# Patient Record
Sex: FEMALE | Race: WHITE | NOT HISPANIC OR LATINO
[De-identification: names, ages, dates, MRNs, and addresses within clinical notes are randomized per-mention and may not be internally consistent; named-entity substitution may affect disease eponyms.]

---

## 2019-01-22 ENCOUNTER — RESULT REVIEW (OUTPATIENT)
Age: 74
End: 2019-01-22

## 2019-01-22 ENCOUNTER — OUTPATIENT (OUTPATIENT)
Dept: OUTPATIENT SERVICES | Facility: HOSPITAL | Age: 74
LOS: 1 days | Discharge: HOME | End: 2019-01-22

## 2019-01-22 RX ORDER — HYDROCODONE BITARTRATE AND IBUPROFEN 7.5; 2 MG/1; MG/1
1 TABLET ORAL
Qty: 20 | Refills: 0
Start: 2019-01-22

## 2019-01-23 LAB — SURGICAL PATHOLOGY STUDY: SIGNIFICANT CHANGE UP

## 2022-12-30 PROBLEM — Z00.00 ENCOUNTER FOR PREVENTIVE HEALTH EXAMINATION: Status: ACTIVE | Noted: 2022-12-30

## 2023-01-26 ENCOUNTER — NON-APPOINTMENT (OUTPATIENT)
Age: 78
End: 2023-01-26

## 2023-01-26 ENCOUNTER — APPOINTMENT (OUTPATIENT)
Dept: HEART AND VASCULAR | Facility: CLINIC | Age: 78
End: 2023-01-26
Payer: MEDICARE

## 2023-01-26 VITALS
TEMPERATURE: 98.3 F | HEIGHT: 63 IN | SYSTOLIC BLOOD PRESSURE: 122 MMHG | HEART RATE: 77 BPM | WEIGHT: 137 LBS | BODY MASS INDEX: 24.27 KG/M2 | DIASTOLIC BLOOD PRESSURE: 68 MMHG | OXYGEN SATURATION: 96 %

## 2023-01-26 PROCEDURE — 93000 ELECTROCARDIOGRAM COMPLETE: CPT

## 2023-01-26 PROCEDURE — 99204 OFFICE O/P NEW MOD 45 MIN: CPT

## 2023-01-26 RX ORDER — SACCHAROMYCES BOULARDII 50 MG
CAPSULE ORAL TWICE DAILY
Refills: 0 | Status: ACTIVE | COMMUNITY

## 2023-01-26 RX ORDER — PANTOPRAZOLE 40 MG/1
40 TABLET, DELAYED RELEASE ORAL
Refills: 0 | Status: ACTIVE | COMMUNITY

## 2023-01-26 NOTE — HISTORY OF PRESENT ILLNESS
[FreeTextEntry1] : formerly with Dr Manjula Rushing\par GYN- Dr Sotelo\par GI- Dr Eastman\par Derm- Rebekah\par ENT- Dr Reyes\par Ophtho- Dr Musa

## 2023-01-26 NOTE — ASSESSMENT
[FreeTextEntry1] : HTN- BP excellent, no changes\par \par HLD- LDL 67, 73 on Lipitor 10 mg\par \par ASHD- CCS = 11, mild LAD dz with calcified and non-calcified plaque\par \par Health Maintanence- Shingrix x 2 2018,  Pneumovax 23 2013, Prevnar 13 2018, Flu shot annually Covid UTD\par \par CA Screening- Mammo 6/2022, colonoscopy 2021

## 2023-01-27 LAB
ANION GAP SERPL CALC-SCNC: 11 MMOL/L
APPEARANCE: CLEAR
BACTERIA: NEGATIVE
BILIRUBIN URINE: NEGATIVE
BLOOD URINE: NEGATIVE
BUN SERPL-MCNC: 12 MG/DL
CALCIUM SERPL-MCNC: 9.9 MG/DL
CHLORIDE SERPL-SCNC: 101 MMOL/L
CO2 SERPL-SCNC: 28 MMOL/L
COLOR: NORMAL
CREAT SERPL-MCNC: 0.96 MG/DL
EGFR: 62 ML/MIN/1.73M2
GLUCOSE QUALITATIVE U: NEGATIVE
GLUCOSE SERPL-MCNC: 90 MG/DL
HYALINE CASTS: 0 /LPF
KETONES URINE: NEGATIVE
LEUKOCYTE ESTERASE URINE: NEGATIVE
MICROSCOPIC-UA: NORMAL
NITRITE URINE: NEGATIVE
PH URINE: 7
POTASSIUM SERPL-SCNC: 3.9 MMOL/L
PROTEIN URINE: NEGATIVE
RED BLOOD CELLS URINE: 2 /HPF
SODIUM SERPL-SCNC: 139 MMOL/L
SPECIFIC GRAVITY URINE: 1.01
SQUAMOUS EPITHELIAL CELLS: 1 /HPF
UROBILINOGEN URINE: NORMAL
WHITE BLOOD CELLS URINE: 0 /HPF

## 2023-06-27 VITALS
TEMPERATURE: 98 F | OXYGEN SATURATION: 95 % | DIASTOLIC BLOOD PRESSURE: 71 MMHG | RESPIRATION RATE: 16 BRPM | WEIGHT: 134.48 LBS | SYSTOLIC BLOOD PRESSURE: 160 MMHG | HEART RATE: 89 BPM | HEIGHT: 63 IN

## 2023-06-27 PROCEDURE — 99291 CRITICAL CARE FIRST HOUR: CPT

## 2023-06-27 NOTE — ED ADULT NURSE NOTE - OBJECTIVE STATEMENT
Pt complaints of sudden onset of midsternal chest pain, chest pressure since 9PM tonight s/p stress.   Pt denies SOB, HA, Dizziness, N/V/D, or any other discomfort at this time.  Pt is alert and oriented, A&O4, steady gait noted. Family member is on bedside.

## 2023-06-27 NOTE — ED ADULT NURSE NOTE - NSFALLUNIVINTERV_ED_ALL_ED
Bed/Stretcher in lowest position, wheels locked, appropriate side rails in place/Call bell, personal items and telephone in reach/Instruct patient to call for assistance before getting out of bed/chair/stretcher/Non-slip footwear applied when patient is off stretcher/[place] to call system/Physically safe environment - no spills, clutter or unnecessary equipment/Purposeful proactive rounding/Room/bathroom lighting operational, light cord in reach

## 2023-06-27 NOTE — ED ADULT TRIAGE NOTE - CHIEF COMPLAINT QUOTE
I will SWITCH the dose or number of times a day I take the medications listed below when I get home from the hospital:  None
Pt, who reports being under a lot of stress" presents to ER c/o sudden onset of midsternal, non-radiating, non-reproducible chest "pressure, intense discomfort 7/10" since ~2115 tonight. 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

## 2023-06-27 NOTE — ED ADULT NURSE NOTE - CHIEF COMPLAINT QUOTE
Pt, who reports being under a lot of stress" presents to ER c/o sudden onset of midsternal, non-radiating, non-reproducible chest "pressure, intense discomfort 7/10" since ~2115 tonight. 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

## 2023-06-28 ENCOUNTER — INPATIENT (INPATIENT)
Facility: HOSPITAL | Age: 78
LOS: 0 days | Discharge: HOME | DRG: 281 | End: 2023-06-28
Attending: INTERNAL MEDICINE | Admitting: INTERNAL MEDICINE
Payer: COMMERCIAL

## 2023-06-28 ENCOUNTER — TRANSCRIPTION ENCOUNTER (OUTPATIENT)
Age: 78
End: 2023-06-28

## 2023-06-28 VITALS
SYSTOLIC BLOOD PRESSURE: 123 MMHG | OXYGEN SATURATION: 95 % | RESPIRATION RATE: 18 BRPM | HEART RATE: 75 BPM | DIASTOLIC BLOOD PRESSURE: 60 MMHG

## 2023-06-28 DIAGNOSIS — Z98.890 OTHER SPECIFIED POSTPROCEDURAL STATES: Chronic | ICD-10-CM

## 2023-06-28 DIAGNOSIS — I21.4 NON-ST ELEVATION (NSTEMI) MYOCARDIAL INFARCTION: ICD-10-CM

## 2023-06-28 DIAGNOSIS — I10 ESSENTIAL (PRIMARY) HYPERTENSION: ICD-10-CM

## 2023-06-28 DIAGNOSIS — K21.9 GASTRO-ESOPHAGEAL REFLUX DISEASE WITHOUT ESOPHAGITIS: ICD-10-CM

## 2023-06-28 DIAGNOSIS — E78.5 HYPERLIPIDEMIA, UNSPECIFIED: ICD-10-CM

## 2023-06-28 DIAGNOSIS — Z90.49 ACQUIRED ABSENCE OF OTHER SPECIFIED PARTS OF DIGESTIVE TRACT: Chronic | ICD-10-CM

## 2023-06-28 LAB
A1C WITH ESTIMATED AVERAGE GLUCOSE RESULT: 5 % — SIGNIFICANT CHANGE UP (ref 4–5.6)
ALBUMIN SERPL ELPH-MCNC: 4.4 G/DL — SIGNIFICANT CHANGE UP (ref 3.3–5)
ALP SERPL-CCNC: 88 U/L — SIGNIFICANT CHANGE UP (ref 40–120)
ALT FLD-CCNC: 20 U/L — SIGNIFICANT CHANGE UP (ref 10–45)
ANION GAP SERPL CALC-SCNC: 11 MMOL/L — SIGNIFICANT CHANGE UP (ref 5–17)
ANION GAP SERPL CALC-SCNC: 12 MMOL/L — SIGNIFICANT CHANGE UP (ref 5–17)
ANION GAP SERPL CALC-SCNC: 6 MMOL/L — SIGNIFICANT CHANGE UP (ref 5–17)
AST SERPL-CCNC: 32 U/L — SIGNIFICANT CHANGE UP (ref 10–40)
BASOPHILS # BLD AUTO: 0.04 K/UL — SIGNIFICANT CHANGE UP (ref 0–0.2)
BASOPHILS NFR BLD AUTO: 0.4 % — SIGNIFICANT CHANGE UP (ref 0–2)
BILIRUB SERPL-MCNC: 0.3 MG/DL — SIGNIFICANT CHANGE UP (ref 0.2–1.2)
BUN SERPL-MCNC: 10 MG/DL — SIGNIFICANT CHANGE UP (ref 7–23)
BUN SERPL-MCNC: 11 MG/DL — SIGNIFICANT CHANGE UP (ref 7–23)
BUN SERPL-MCNC: 15 MG/DL — SIGNIFICANT CHANGE UP (ref 7–23)
CALCIUM SERPL-MCNC: 8.2 MG/DL — LOW (ref 8.4–10.5)
CALCIUM SERPL-MCNC: 8.8 MG/DL — SIGNIFICANT CHANGE UP (ref 8.4–10.5)
CALCIUM SERPL-MCNC: 9.5 MG/DL — SIGNIFICANT CHANGE UP (ref 8.4–10.5)
CHLORIDE SERPL-SCNC: 100 MMOL/L — SIGNIFICANT CHANGE UP (ref 96–108)
CHLORIDE SERPL-SCNC: 100 MMOL/L — SIGNIFICANT CHANGE UP (ref 96–108)
CHLORIDE SERPL-SCNC: 108 MMOL/L — SIGNIFICANT CHANGE UP (ref 96–108)
CHOLEST SERPL-MCNC: 143 MG/DL — SIGNIFICANT CHANGE UP
CK MB CFR SERPL CALC: 20.6 NG/ML — HIGH (ref 0–6.7)
CK MB CFR SERPL CALC: 26 NG/ML — HIGH (ref 0–6.7)
CK SERPL-CCNC: 206 U/L — HIGH (ref 25–170)
CK SERPL-CCNC: 235 U/L — HIGH (ref 25–170)
CO2 SERPL-SCNC: 25 MMOL/L — SIGNIFICANT CHANGE UP (ref 22–31)
CO2 SERPL-SCNC: 27 MMOL/L — SIGNIFICANT CHANGE UP (ref 22–31)
CO2 SERPL-SCNC: 29 MMOL/L — SIGNIFICANT CHANGE UP (ref 22–31)
CREAT SERPL-MCNC: 0.85 MG/DL — SIGNIFICANT CHANGE UP (ref 0.5–1.3)
CREAT SERPL-MCNC: 0.86 MG/DL — SIGNIFICANT CHANGE UP (ref 0.5–1.3)
CREAT SERPL-MCNC: 0.95 MG/DL — SIGNIFICANT CHANGE UP (ref 0.5–1.3)
EGFR: 62 ML/MIN/1.73M2 — SIGNIFICANT CHANGE UP
EGFR: 70 ML/MIN/1.73M2 — SIGNIFICANT CHANGE UP
EGFR: 71 ML/MIN/1.73M2 — SIGNIFICANT CHANGE UP
EOSINOPHIL # BLD AUTO: 0.05 K/UL — SIGNIFICANT CHANGE UP (ref 0–0.5)
EOSINOPHIL NFR BLD AUTO: 0.5 % — SIGNIFICANT CHANGE UP (ref 0–6)
ESTIMATED AVERAGE GLUCOSE: 97 MG/DL — SIGNIFICANT CHANGE UP (ref 68–114)
GLUCOSE SERPL-MCNC: 112 MG/DL — HIGH (ref 70–99)
GLUCOSE SERPL-MCNC: 117 MG/DL — HIGH (ref 70–99)
GLUCOSE SERPL-MCNC: 130 MG/DL — HIGH (ref 70–99)
HCT VFR BLD CALC: 38.6 % — SIGNIFICANT CHANGE UP (ref 34.5–45)
HCT VFR BLD CALC: 42.8 % — SIGNIFICANT CHANGE UP (ref 34.5–45)
HDLC SERPL-MCNC: 74 MG/DL — SIGNIFICANT CHANGE UP
HGB BLD-MCNC: 13.2 G/DL — SIGNIFICANT CHANGE UP (ref 11.5–15.5)
HGB BLD-MCNC: 14.4 G/DL — SIGNIFICANT CHANGE UP (ref 11.5–15.5)
IMM GRANULOCYTES NFR BLD AUTO: 0.4 % — SIGNIFICANT CHANGE UP (ref 0–0.9)
ISTAT ACTK (ACTIVATED CLOTTING TIME KAOLIN): 161 SEC — HIGH (ref 74–137)
LIPID PNL WITH DIRECT LDL SERPL: 62 MG/DL — SIGNIFICANT CHANGE UP
LYMPHOCYTES # BLD AUTO: 1.86 K/UL — SIGNIFICANT CHANGE UP (ref 1–3.3)
LYMPHOCYTES # BLD AUTO: 16.9 % — SIGNIFICANT CHANGE UP (ref 13–44)
MAGNESIUM SERPL-MCNC: 1.9 MG/DL — SIGNIFICANT CHANGE UP (ref 1.6–2.6)
MAGNESIUM SERPL-MCNC: 2.5 MG/DL — SIGNIFICANT CHANGE UP (ref 1.6–2.6)
MCHC RBC-ENTMCNC: 31.3 PG — SIGNIFICANT CHANGE UP (ref 27–34)
MCHC RBC-ENTMCNC: 31.3 PG — SIGNIFICANT CHANGE UP (ref 27–34)
MCHC RBC-ENTMCNC: 33.6 GM/DL — SIGNIFICANT CHANGE UP (ref 32–36)
MCHC RBC-ENTMCNC: 34.2 GM/DL — SIGNIFICANT CHANGE UP (ref 32–36)
MCV RBC AUTO: 91.5 FL — SIGNIFICANT CHANGE UP (ref 80–100)
MCV RBC AUTO: 93 FL — SIGNIFICANT CHANGE UP (ref 80–100)
MONOCYTES # BLD AUTO: 0.84 K/UL — SIGNIFICANT CHANGE UP (ref 0–0.9)
MONOCYTES NFR BLD AUTO: 7.6 % — SIGNIFICANT CHANGE UP (ref 2–14)
NEUTROPHILS # BLD AUTO: 8.19 K/UL — HIGH (ref 1.8–7.4)
NEUTROPHILS NFR BLD AUTO: 74.2 % — SIGNIFICANT CHANGE UP (ref 43–77)
NON HDL CHOLESTEROL: 69 MG/DL — SIGNIFICANT CHANGE UP
NRBC # BLD: 0 /100 WBCS — SIGNIFICANT CHANGE UP (ref 0–0)
NRBC # BLD: 0 /100 WBCS — SIGNIFICANT CHANGE UP (ref 0–0)
PLATELET # BLD AUTO: 224 K/UL — SIGNIFICANT CHANGE UP (ref 150–400)
PLATELET # BLD AUTO: 232 K/UL — SIGNIFICANT CHANGE UP (ref 150–400)
POTASSIUM SERPL-MCNC: 3.1 MMOL/L — LOW (ref 3.5–5.3)
POTASSIUM SERPL-MCNC: 3.3 MMOL/L — LOW (ref 3.5–5.3)
POTASSIUM SERPL-MCNC: 4.4 MMOL/L — SIGNIFICANT CHANGE UP (ref 3.5–5.3)
POTASSIUM SERPL-SCNC: 3.1 MMOL/L — LOW (ref 3.5–5.3)
POTASSIUM SERPL-SCNC: 3.3 MMOL/L — LOW (ref 3.5–5.3)
POTASSIUM SERPL-SCNC: 4.4 MMOL/L — SIGNIFICANT CHANGE UP (ref 3.5–5.3)
PROT SERPL-MCNC: 7.9 G/DL — SIGNIFICANT CHANGE UP (ref 6–8.3)
RBC # BLD: 4.22 M/UL — SIGNIFICANT CHANGE UP (ref 3.8–5.2)
RBC # BLD: 4.6 M/UL — SIGNIFICANT CHANGE UP (ref 3.8–5.2)
RBC # FLD: 11.7 % — SIGNIFICANT CHANGE UP (ref 10.3–14.5)
RBC # FLD: 11.8 % — SIGNIFICANT CHANGE UP (ref 10.3–14.5)
SODIUM SERPL-SCNC: 137 MMOL/L — SIGNIFICANT CHANGE UP (ref 135–145)
SODIUM SERPL-SCNC: 140 MMOL/L — SIGNIFICANT CHANGE UP (ref 135–145)
SODIUM SERPL-SCNC: 141 MMOL/L — SIGNIFICANT CHANGE UP (ref 135–145)
TRIGL SERPL-MCNC: 36 MG/DL — SIGNIFICANT CHANGE UP
TROPONIN T, HIGH SENSITIVITY RESULT: 540 NG/L — CRITICAL HIGH (ref 0–51)
TROPONIN T, HIGH SENSITIVITY RESULT: 698 NG/L — CRITICAL HIGH (ref 0–51)
TROPONIN T, HIGH SENSITIVITY RESULT: 746 NG/L — CRITICAL HIGH (ref 0–51)
WBC # BLD: 10.27 K/UL — SIGNIFICANT CHANGE UP (ref 3.8–10.5)
WBC # BLD: 11.02 K/UL — HIGH (ref 3.8–10.5)
WBC # FLD AUTO: 10.27 K/UL — SIGNIFICANT CHANGE UP (ref 3.8–10.5)
WBC # FLD AUTO: 11.02 K/UL — HIGH (ref 3.8–10.5)

## 2023-06-28 PROCEDURE — 83880 ASSAY OF NATRIURETIC PEPTIDE: CPT

## 2023-06-28 PROCEDURE — 83735 ASSAY OF MAGNESIUM: CPT

## 2023-06-28 PROCEDURE — 80048 BASIC METABOLIC PNL TOTAL CA: CPT

## 2023-06-28 PROCEDURE — C1769: CPT

## 2023-06-28 PROCEDURE — 99239 HOSP IP/OBS DSCHRG MGMT >30: CPT

## 2023-06-28 PROCEDURE — 85347 COAGULATION TIME ACTIVATED: CPT

## 2023-06-28 PROCEDURE — 93458 L HRT ARTERY/VENTRICLE ANGIO: CPT

## 2023-06-28 PROCEDURE — 82553 CREATINE MB FRACTION: CPT

## 2023-06-28 PROCEDURE — 80061 LIPID PANEL: CPT

## 2023-06-28 PROCEDURE — 85730 THROMBOPLASTIN TIME PARTIAL: CPT

## 2023-06-28 PROCEDURE — 99291 CRITICAL CARE FIRST HOUR: CPT | Mod: 25

## 2023-06-28 PROCEDURE — 84484 ASSAY OF TROPONIN QUANT: CPT

## 2023-06-28 PROCEDURE — C1887: CPT

## 2023-06-28 PROCEDURE — 93306 TTE W/DOPPLER COMPLETE: CPT | Mod: 26

## 2023-06-28 PROCEDURE — 85610 PROTHROMBIN TIME: CPT

## 2023-06-28 PROCEDURE — 93306 TTE W/DOPPLER COMPLETE: CPT

## 2023-06-28 PROCEDURE — 71045 X-RAY EXAM CHEST 1 VIEW: CPT | Mod: 26

## 2023-06-28 PROCEDURE — 82550 ASSAY OF CK (CPK): CPT

## 2023-06-28 PROCEDURE — 71045 X-RAY EXAM CHEST 1 VIEW: CPT

## 2023-06-28 PROCEDURE — 36415 COLL VENOUS BLD VENIPUNCTURE: CPT

## 2023-06-28 PROCEDURE — 85025 COMPLETE CBC W/AUTO DIFF WBC: CPT

## 2023-06-28 PROCEDURE — 85027 COMPLETE CBC AUTOMATED: CPT

## 2023-06-28 PROCEDURE — 93458 L HRT ARTERY/VENTRICLE ANGIO: CPT | Mod: 26

## 2023-06-28 PROCEDURE — 83036 HEMOGLOBIN GLYCOSYLATED A1C: CPT

## 2023-06-28 PROCEDURE — 99152 MOD SED SAME PHYS/QHP 5/>YRS: CPT

## 2023-06-28 PROCEDURE — C1894: CPT

## 2023-06-28 PROCEDURE — 80053 COMPREHEN METABOLIC PANEL: CPT

## 2023-06-28 PROCEDURE — 93005 ELECTROCARDIOGRAM TRACING: CPT

## 2023-06-28 RX ORDER — ACETAMINOPHEN 500 MG
650 TABLET ORAL ONCE
Refills: 0 | Status: COMPLETED | OUTPATIENT
Start: 2023-06-28 | End: 2023-06-28

## 2023-06-28 RX ORDER — FAMOTIDINE 10 MG/ML
1 INJECTION INTRAVENOUS
Refills: 0 | DISCHARGE

## 2023-06-28 RX ORDER — AMLODIPINE BESYLATE 2.5 MG/1
1 TABLET ORAL
Refills: 0 | DISCHARGE

## 2023-06-28 RX ORDER — POTASSIUM CHLORIDE 20 MEQ
40 PACKET (EA) ORAL ONCE
Refills: 0 | Status: COMPLETED | OUTPATIENT
Start: 2023-06-28 | End: 2023-06-28

## 2023-06-28 RX ORDER — FAMOTIDINE 10 MG/ML
20 INJECTION INTRAVENOUS DAILY
Refills: 0 | Status: DISCONTINUED | OUTPATIENT
Start: 2023-06-28 | End: 2023-06-28

## 2023-06-28 RX ORDER — CLOPIDOGREL BISULFATE 75 MG/1
600 TABLET, FILM COATED ORAL ONCE
Refills: 0 | Status: COMPLETED | OUTPATIENT
Start: 2023-06-28 | End: 2023-06-28

## 2023-06-28 RX ORDER — PANTOPRAZOLE SODIUM 20 MG/1
1 TABLET, DELAYED RELEASE ORAL
Refills: 0 | DISCHARGE

## 2023-06-28 RX ORDER — POTASSIUM CHLORIDE 20 MEQ
1 PACKET (EA) ORAL
Refills: 0 | DISCHARGE

## 2023-06-28 RX ORDER — ASPIRIN/CALCIUM CARB/MAGNESIUM 324 MG
81 TABLET ORAL DAILY
Refills: 0 | Status: DISCONTINUED | OUTPATIENT
Start: 2023-06-29 | End: 2023-06-28

## 2023-06-28 RX ORDER — CHOLECALCIFEROL (VITAMIN D3) 125 MCG
1 CAPSULE ORAL
Refills: 0 | DISCHARGE

## 2023-06-28 RX ORDER — ASPIRIN/CALCIUM CARB/MAGNESIUM 324 MG
1 TABLET ORAL
Qty: 30 | Refills: 11
Start: 2023-06-28

## 2023-06-28 RX ORDER — AMLODIPINE BESYLATE 2.5 MG/1
5 TABLET ORAL AT BEDTIME
Refills: 0 | Status: DISCONTINUED | OUTPATIENT
Start: 2023-06-28 | End: 2023-06-28

## 2023-06-28 RX ORDER — LOSARTAN POTASSIUM 100 MG/1
1 TABLET, FILM COATED ORAL
Qty: 30 | Refills: 11
Start: 2023-06-28

## 2023-06-28 RX ORDER — ASPIRIN/CALCIUM CARB/MAGNESIUM 324 MG
325 TABLET ORAL ONCE
Refills: 0 | Status: COMPLETED | OUTPATIENT
Start: 2023-06-28 | End: 2023-06-28

## 2023-06-28 RX ORDER — SODIUM CHLORIDE 9 MG/ML
1000 INJECTION INTRAMUSCULAR; INTRAVENOUS; SUBCUTANEOUS
Refills: 0 | Status: DISCONTINUED | OUTPATIENT
Start: 2023-06-28 | End: 2023-06-28

## 2023-06-28 RX ORDER — LORATADINE 10 MG/1
10 TABLET ORAL DAILY
Refills: 0 | Status: DISCONTINUED | OUTPATIENT
Start: 2023-06-28 | End: 2023-06-28

## 2023-06-28 RX ORDER — NITROGLYCERIN 6.5 MG
0.4 CAPSULE, EXTENDED RELEASE ORAL ONCE
Refills: 0 | Status: COMPLETED | OUTPATIENT
Start: 2023-06-28 | End: 2023-06-28

## 2023-06-28 RX ORDER — PANTOPRAZOLE SODIUM 20 MG/1
40 TABLET, DELAYED RELEASE ORAL
Refills: 0 | Status: DISCONTINUED | OUTPATIENT
Start: 2023-06-28 | End: 2023-06-28

## 2023-06-28 RX ORDER — HYDROCHLOROTHIAZIDE 25 MG
1 TABLET ORAL
Refills: 0 | DISCHARGE

## 2023-06-28 RX ORDER — HEPARIN SODIUM 5000 [USP'U]/ML
INJECTION INTRAVENOUS; SUBCUTANEOUS
Qty: 25000 | Refills: 0 | Status: DISCONTINUED | OUTPATIENT
Start: 2023-06-28 | End: 2023-06-28

## 2023-06-28 RX ORDER — CHOLECALCIFEROL (VITAMIN D3) 125 MCG
1000 CAPSULE ORAL DAILY
Refills: 0 | Status: DISCONTINUED | OUTPATIENT
Start: 2023-06-28 | End: 2023-06-28

## 2023-06-28 RX ORDER — MAGNESIUM SULFATE 500 MG/ML
2 VIAL (ML) INJECTION ONCE
Refills: 0 | Status: COMPLETED | OUTPATIENT
Start: 2023-06-28 | End: 2023-06-28

## 2023-06-28 RX ORDER — METOPROLOL TARTRATE 50 MG
12.5 TABLET ORAL
Refills: 0 | Status: DISCONTINUED | OUTPATIENT
Start: 2023-06-28 | End: 2023-06-28

## 2023-06-28 RX ORDER — ATORVASTATIN CALCIUM 80 MG/1
80 TABLET, FILM COATED ORAL AT BEDTIME
Refills: 0 | Status: DISCONTINUED | OUTPATIENT
Start: 2023-06-28 | End: 2023-06-28

## 2023-06-28 RX ORDER — FEXOFENADINE HCL 30 MG
1 TABLET ORAL
Refills: 0 | DISCHARGE

## 2023-06-28 RX ORDER — AMLODIPINE BESYLATE 2.5 MG/1
2.5 TABLET ORAL DAILY
Refills: 0 | Status: DISCONTINUED | OUTPATIENT
Start: 2023-06-28 | End: 2023-06-28

## 2023-06-28 RX ORDER — POTASSIUM CHLORIDE 20 MEQ
10 PACKET (EA) ORAL
Refills: 0 | Status: DISCONTINUED | OUTPATIENT
Start: 2023-06-28 | End: 2023-06-28

## 2023-06-28 RX ORDER — METOPROLOL TARTRATE 50 MG
1 TABLET ORAL
Qty: 30 | Refills: 11
Start: 2023-06-28

## 2023-06-28 RX ORDER — ESTROGENS, CONJUGATED 0.625 MG
1 TABLET ORAL
Refills: 0 | DISCHARGE

## 2023-06-28 RX ORDER — CLOPIDOGREL BISULFATE 75 MG/1
75 TABLET, FILM COATED ORAL DAILY
Refills: 0 | Status: DISCONTINUED | OUTPATIENT
Start: 2023-06-29 | End: 2023-06-28

## 2023-06-28 RX ORDER — HEPARIN SODIUM 5000 [USP'U]/ML
3800 INJECTION INTRAVENOUS; SUBCUTANEOUS ONCE
Refills: 0 | Status: COMPLETED | OUTPATIENT
Start: 2023-06-28 | End: 2023-06-28

## 2023-06-28 RX ORDER — ATORVASTATIN CALCIUM 80 MG/1
1 TABLET, FILM COATED ORAL
Refills: 0 | DISCHARGE

## 2023-06-28 RX ADMIN — SODIUM CHLORIDE 150 MILLILITER(S): 9 INJECTION INTRAMUSCULAR; INTRAVENOUS; SUBCUTANEOUS at 09:37

## 2023-06-28 RX ADMIN — AMLODIPINE BESYLATE 2.5 MILLIGRAM(S): 2.5 TABLET ORAL at 07:14

## 2023-06-28 RX ADMIN — PANTOPRAZOLE SODIUM 40 MILLIGRAM(S): 20 TABLET, DELAYED RELEASE ORAL at 07:14

## 2023-06-28 RX ADMIN — HEPARIN SODIUM 3800 UNIT(S): 5000 INJECTION INTRAVENOUS; SUBCUTANEOUS at 03:37

## 2023-06-28 RX ADMIN — CLOPIDOGREL BISULFATE 600 MILLIGRAM(S): 75 TABLET, FILM COATED ORAL at 03:37

## 2023-06-28 RX ADMIN — Medication 1 TABLET(S): at 13:42

## 2023-06-28 RX ADMIN — Medication 650 MILLIGRAM(S): at 14:30

## 2023-06-28 RX ADMIN — Medication 40 MILLIEQUIVALENT(S): at 03:37

## 2023-06-28 RX ADMIN — Medication 12.5 MILLIGRAM(S): at 17:55

## 2023-06-28 RX ADMIN — Medication 325 MILLIGRAM(S): at 00:55

## 2023-06-28 RX ADMIN — Medication 12.5 MILLIGRAM(S): at 07:14

## 2023-06-28 RX ADMIN — Medication 650 MILLIGRAM(S): at 13:41

## 2023-06-28 RX ADMIN — Medication 1000 UNIT(S): at 13:42

## 2023-06-28 RX ADMIN — Medication 0.5 MILLIGRAM(S): at 00:43

## 2023-06-28 RX ADMIN — Medication 40 MILLIEQUIVALENT(S): at 05:43

## 2023-06-28 RX ADMIN — HEPARIN SODIUM 750 UNIT(S)/HR: 5000 INJECTION INTRAVENOUS; SUBCUTANEOUS at 03:37

## 2023-06-28 RX ADMIN — Medication 25 GRAM(S): at 05:44

## 2023-06-28 RX ADMIN — Medication 0.4 MILLIGRAM(S): at 03:46

## 2023-06-28 NOTE — H&P ADULT - GASTROINTESTINAL
Dr. Munir Malin pumonary, [facility] [phone]
normal/soft/nontender/nondistended/normal active bowel sounds

## 2023-06-28 NOTE — H&P ADULT - PROBLEM SELECTOR PLAN 4
continue HOME MED: continue Pantoprazole 40mg PO daily.      N: NPO except medications  E: Maintain K>4.0 and Mg>2.0  VTE PPx: on Heparin gtt  Code: Full continue HOME MED: continue Pantoprazole 40mg PO daily and Pepcid 20mg PO daily.      N: NPO except medications  E: Maintain K>4.0 and Mg>2.0  VTE PPx: on Heparin gtt  Code: Full

## 2023-06-28 NOTE — DISCHARGE NOTE PROVIDER - NSDCFUADDAPPT_GEN_ALL_CORE_FT
Please bring your Insurance card, Photo ID and Discharge paperwork to the following appointment:    (1) Please follow up with your Cardiology Provider, Dr. Damian Molina at [address] [place] [address] on 07/06/2023 at 9:00am.    Appointment was scheduled by Ms. MATT Hall, Referral Coordinator.

## 2023-06-28 NOTE — DISCHARGE NOTE PROVIDER - NSDCFUSCHEDAPPT_GEN_ALL_CORE_FT
Damian Molina Physician [facility]  [facility] [address]  Scheduled Appointment: 09/05/2023     Damian Molina Physician [facility]  [facility] [address]  Scheduled Appointment: 07/06/2023

## 2023-06-28 NOTE — DIETITIAN INITIAL EVALUATION ADULT - OTHER INFO
77 yr old F, former ETOH abuse (quit 1/2020), PMHx HTN, hyperlipidemia, GERD who presents to [facility] ED 6/27/23 c/o sudden onset nonradiating substernal crushing CP 7-8/10 in severity after an argument with HHA this evening; admits to excessive emotional stress over the past year as her husbands health has suddenly declined and now peaked as her long standing HHA will no longer be working with them. Prior CCTA 5/17/2022: revealed Calcium score 11, mild LAD lesion with calcified and non-calcified plaque. Pt now admitted to cardiac telemetry for management of NSTEMI and further ischemic work-up. Pt pending ardiac catheterization/TTE for structural assessment.    Pt seen this AM on 5UR. Spoke with RN/pt. Pt with little concern for RD visit at this time. Pt had been NPO during time of visit-RN reports diet planned to be adv, pending MD order at this time. PTA pt reports good appetite. Reports eating the same things often in setting of GERD. Pt will avoid tomatoes sauce and garlic in setting of GERD. NKFA. Likes to drink water. Noted HHA PTA, did not state if HHA aides in meal prep, however noted that HHA will no longer be working for patient.  consult Pending. No issues chewing/swallowing. No issues chewing/swallowing. Denies NVDC, Protonix is ordered. Denies wt change. UBW is consistent with admit wt 138 pounds. K 3.1, KCL ordered. Other labs of note: Lipids WDL, , Trop T 746, Na WDL, BUN/Cr WDL.   Please see below for nutritions recommendations.  77 yr old F, former ETOH abuse (quit 1/2020), PMHx HTN, hyperlipidemia, GERD who presents to [facility] ED 6/27/23 c/o sudden onset nonradiating substernal crushing CP 7-8/10 in severity after an argument with HHA this evening; admits to excessive emotional stress over the past year as her husbands health has suddenly declined and now peaked as her long standing HHA will no longer be working with them. Prior CCTA 5/17/2022: revealed Calcium score 11, mild LAD lesion with calcified and non-calcified plaque. Pt now admitted to cardiac telemetry for management of NSTEMI and further ischemic work-up. Pt pending ardiac catheterization/TTE for structural assessment.    Pt seen this AM on 5UR. Spoke with RN/pt. Pt with little concern for RD visit at this time. Pt had been NPO during time of visit-RN reports diet planned to be adv, pending MD order at this time. PTA pt reports good appetite. Reports eating the same things often in setting of GERD. Pt will avoid tomatoes sauce and garlic in setting of GERD. NKFA. Likes to drink water. Noted HHA PTA, did not state if HHA aides in meal prep, however noted that HHA will no longer be working for patient.  consult Pending. No issues chewing/swallowing. Denies NVDC, Protonix is ordered. Denies wt change. UBW is consistent with admit wt 138 pounds. K 3.1, KCL ordered. Other labs of note: Lipids WDL, , Trop T 746, Na WDL, BUN/Cr WDL.   Please see below for nutritions recommendations.

## 2023-06-28 NOTE — ED PROVIDER NOTE - PHYSICAL EXAMINATION
CONST: nontoxic NAD speaking in full sentences ambulating comfortably around ED  HEAD: atraumatic  EYES: conjunctivae clear, PERRL, EOMI  ENT: mmm  NECK: supple/FROM, no jvd  CARD: rrr no murmurs  CHEST: ctab no r/r/w  ABD: soft, nd, nttp, no rebound/guarding  EXT: FROM, symmetric distal pulses intact  SKIN: warm, dry, no rash, no pedal edema/ttp/rash, cap refill <2sec  NEURO: a+ox3, 5/5 strength x4, gross sensation intact x4, baseline gait

## 2023-06-28 NOTE — H&P ADULT - ASSESSMENT
77 yr old F, former ETOH abuse (quit 1/2020), with PMHx of HTN, hyperlipidemia, GERD who presents to [facility] ED 6/27/23 c/o sudden onset nonradiating SSCP 7/10 in severity after argument with HHA this evening, EKG SR w/ q waves in precordial leads, Trop T uptrending 540 to 698. Patient now admitted to cardiac telemetry for management of NSTEMI and further ischemic work-up.    77 yr old F, former ETOH abuse (quit 1/2020), with PMHx of HTN, hyperlipidemia, GERD who presents to [facility] ED 6/27/23 c/o sudden onset nonradiating SSCP 7/10 in severity after argument with HHA this evening, EKG SR w/ q waves in precordial leads, Trop T uptrending 540 to 698, CKMB 20.6, . Patient now admitted to cardiac telemetry for management of NSTEMI and further ischemic work-up.    77 yr old F, former ETOH abuse (quit 1/2020), with PMHx of HTN, hyperlipidemia, GERD who presents to [facility] ED 6/27/23 c/o sudden onset nonradiating SSCP 7/10 in severity after argument with HHA this evening, EKG SR w/ q waves in precordial leads, Trop T uptrending 540 to 698, CKMB 20.6, . Patient now admitted to cardiac telemetry for management of NSTEMI and further ischemic work-up.       ASA:III			Mallampati class: III	    Sedation Plan: Moderate  Patient Is Suitable Candidate For Sedation: Yes    Cardiac: Risks & benefits of procedure and alternative therapy have been explained to the patient &/or HCP including but not limited to: allergic reaction, bleeding, infection, arrhythmia, renal and vascular compromise, limb damage, MI, CVA, emergent CABG and death. Informed consent obtained and in chart.

## 2023-06-28 NOTE — DIETITIAN INITIAL EVALUATION ADULT - ORAL NUTRITION SUPPLEMENTS
Consider oral nutrition supplements if PO intake falls below 50% of meals s/p diet adv  Micro-nutrients/Vitamins/Minerals per team d/t noted prior ETOH abuse

## 2023-06-28 NOTE — DIETITIAN INITIAL EVALUATION ADULT - REASON FOR ADMISSION
NSTEMI     Price (Do Not Change): 0.00 Detail Level: Simple Instructions: This plan will send the code FBSD to the PM system.  DO NOT or CHANGE the price.

## 2023-06-28 NOTE — ED PROVIDER NOTE - ST/T WAVE
no st/t changes unchanged lead V1 twi but ?new precordial q waves isolated lead V1 twi unchanged lead V1 twi, new VL/V2 twi

## 2023-06-28 NOTE — DIETITIAN INITIAL EVALUATION ADULT - PERTINENT LABORATORY DATA
06-28    137  |  100  |  11  ----------------------------<  130<H>  3.1<L>   |  25  |  0.85    Ca    8.2<L>      28 Jun 2023 04:44  Mg     1.9     06-28    TPro  7.9  /  Alb  4.4  /  TBili  0.3  /  DBili  x   /  AST  32  /  ALT  20  /  AlkPhos  88  06-27  A1C with Estimated Average Glucose Result: 5.0 % (06-28-23 @ 04:44)

## 2023-06-28 NOTE — ED PROVIDER NOTE - OBJECTIVE STATEMENT
77F nonsmoker, former etoh abuse (cessation 1/2020), htn, hld, gerd, c/o acute severe constant 7/10 nonradiating nonpositional nonpleuritic nonexertional ss chest pressure/tightness since getting into argument w/ hha around 9:15p today. reports excessive emotional stressors over the past 15mo around  becoming sick last year and then tonight peaked when longstanding hha suddenly quit. at baseline just pior. prior less severe and sharp cp instead of pressure/tightness s/p ccta calcium score 11 (5/17/22). no fever/chills, no ha/dizziness, no uri/cough, no sob, no palpitations, no exertional dyspnea, no orthopnea, no abd pain/n/v, no back pain, no pedal edema/pain/rash, no recent travel, no trauma, no etoh-dpt/ivdu/recreational drugs, no phx acs/mi, no antiplatelet/AC.    pcp: ricarda 77F nonsmoker, former etoh abuse (cessation 1/2020), htn, hld, gerd, c/o acute severe constant 7/10 nonradiating nonpositional nonpleuritic nonexertional ss chest pressure/tightness since getting into argument w/ hha around 9:15p today. reports excessive emotional stressors over the past 15mo around  becoming sick last year and then tonight peaked when longstanding hha suddenly quit. at baseline just prior. prior less severe and sharp cp instead of pressure/tightness s/p ccta calcium score 11 (5/17/22). no fever/chills, no ha/dizziness, no uri/cough, no sob, no palpitations, no exertional dyspnea, no orthopnea, no abd pain/n/v, no back pain, no pedal edema/pain/rash, no recent travel, no trauma, no etoh-dpt/ivdu/recreational drugs, no phx acs/mi, no antiplatelet/AC.    pcp: ricarda

## 2023-06-28 NOTE — H&P ADULT - HISTORY OF PRESENT ILLNESS
77 yr old F, former ETOH abuse (quit 1/2020), with PMHx of HTN, hyperlipidemia, GERD who presents to [facility] ED 6/27/23 c/o sudden onset nonradiating SSCP 7/10 in severity after argument with HHA this evening. Patient denies any N/V, diaphoresis, palpitations, dizziness, PND, orthopnea, LE edema, recent travel or sick contacts. Patient admits to excessive emtional stress over the past year as her husbands health has suddenly declined and now peaked as her long standing HHA suddenly quit tonight. Prior CCTA 5/17/2022: revealed Calcium score 11, mild LAD lesion with calcified and non-calcified plaque.       In ED, BP: 160s/70s, HR: 80s, RR: 16, Temp: 98.5F, O2 sat: 95% RA. Initial EKG revealed NSR@80BPM with q waves in precordial leads. Repeat EKG: NSR@75BPM with q waves in precordial leads, and new TWI AVL and V2.  CXR unremarkable.    Labs notable for: WBC 11.02 without significant shift, Troponin T (high sensitivity) 540 with repeat 698.     Patient R/I NSTEMI, given ASA 325mg PO x 1 dose, Plavix 600mg PO x 1 dose and started on Heparin gtt as per ACS protocol.     Patient now admitted to cardiac telemetry for management of NSTEMI and further ischemic work-up.  77 yr old F, former ETOH abuse (quit 1/2020), with PMHx of HTN, hyperlipidemia, GERD who presents to [facility] ED 6/27/23 c/o sudden onset nonradiating SSCP 7/10 in severity after argument with HHA this evening. Patient denies any N/V, diaphoresis, palpitations, dizziness, PND, orthopnea, LE edema, recent travel or sick contacts. Patient admits to excessive emtional stress over the past year as her husbands health has suddenly declined and now peaked as her long standing HHA suddenly quit tonight. Prior CCTA 5/17/2022: revealed Calcium score 11, mild LAD lesion with calcified and non-calcified plaque.       In ED, BP: 160s/70s, HR: 80s, RR: 16, Temp: 98.5F, O2 sat: 95% RA. Initial EKG revealed NSR@80BPM with q waves in precordial leads. Repeat EKG: NSR@75BPM with q waves in precordial leads, and new TWI AVL and V2.  CXR unremarkable.    Labs notable for: WBC 11.02 without significant shift, K 3.3, Troponin T (high sensitivity) 540 with repeat 698, CKMB 20.6, .    Patient R/I NSTEMI, given ASA 325mg PO x 1 dose, Plavix 600mg PO x 1 dose and started on Heparin gtt as per ACS protocol.     Patient now admitted to cardiac telemetry for management of NSTEMI and further ischemic work-up.  77 yr old F, former ETOH abuse (quit 1/2020), with PMHx of HTN, hyperlipidemia, GERD who presents to [facility] ED 6/27/23 c/o sudden onset nonradiating substernal crushing CP 7-8/10 in severity after an argument with HHA this evening. Patient denies any N/V, diaphoresis, palpitations, dizziness, PND, orthopnea, LE edema, recent travel or sick contacts. Patient states symptoms have been waxing and waning for the past 6 hours.  Patient admits to excessive emotional stress over the past year as her husbands health has suddenly declined and now peaked as her long standing HHA will no longer be working with them. Prior CCTA 5/17/2022: revealed Calcium score 11, mild LAD lesion with calcified and non-calcified plaque.     In ED, BP: 160s/70s, HR: 80s, RR: 16, Temp: 98.5F, O2 sat: 95% RA. Initial EKG revealed NSR@80BPM with q waves in precordial leads. Repeat EKG: NSR@75BPM with q waves in precordial leads, and new TWI AVL and V2.  CXR unremarkable.Labs notable for: WBC 11.02 without significant shift, K 3.3, Troponin T (high sensitivity) 540 with repeat 698, CKMB 20.6, . Patient R/I NSTEMI, given ASA 325mg PO x 1 dose, Plavix 600mg PO x 1 dose and started on Heparin gtt as per ACS protocol. Patient now admitted to cardiac telemetry for management of NSTEMI and further ischemic work-up.

## 2023-06-28 NOTE — DIETITIAN INITIAL EVALUATION ADULT - OTHER CALCULATIONS
IBW used to calculate energy needs due to pt's current body weight exceeding 120% of IBW  Adjust for age and current conditions

## 2023-06-28 NOTE — ED ADULT NURSE REASSESSMENT NOTE - NS ED NURSE REASSESS COMMENT FT1
Patient medicated as ordered at this time per inpatient PA order. Patient made aware that she is awaiting transport, verbalized understanding.

## 2023-06-28 NOTE — DISCHARGE NOTE PROVIDER - CARE PROVIDER_API CALL
Damian Molina  Cardiovascular Disease  [address] [address]  Phone: [phone]  Fax: [phone]  Established Patient  Follow Up Time: 1 week   Damian Molina  Cardiovascular Disease  [address] [address]  Phone: [phone]  Fax: [phone]  Established Patient  Scheduled Appointment: 07/06/2023 09:00 AM

## 2023-06-28 NOTE — H&P ADULT - NSHPOUTPATIENTPROVIDERS_GEN_ALL_CORE
Cardiologist- Dr. Molina, GYN- Dr Sotelo, GI- Dr Eastman, Derm- Dr. Welch, ENT- Dr Reyes, Ophtho- Dr Musa Cardiologist- Dr. Molina, GYN- Dr Sotelo, GI- Dr Eastman, Derm- Dr. Welch, ENT- Dr Reyes Cardiologist- Dr. Molina, Daughter/Cierra Rosado [phone]

## 2023-06-28 NOTE — DISCHARGE NOTE PROVIDER - NSDCQMERRANDS_GEN_ALL_CORE
Health Maintenance Due   Topic Date Due   • Hepatitis B Vaccine (1 of 3 - 3-dose series) Never done   • Shingles Vaccine (1 of 2) Never done   • DTaP/Tdap/Td Vaccine (1 - Tdap) 10/26/2006   • COVID-19 Vaccine (4 - Booster for Pfizer series) 12/29/2021   • Medicare Advantage- Medicare Wellness Visit  01/01/2022   • Influenza Vaccine (1) 09/01/2022   • DM/CKD Microalbumin  12/28/2022       Patient is due for topics listed above, she wishes to proceed with Immunization(s) Influenza, Depression Screening , DM/CKD Microalbumin and MWV (Medicare Wellness Visit), but is not proceeding with Immunization(s) COVID-19, Dtap/Tdap/Td, Hep B and Shingles at this time.    No

## 2023-06-28 NOTE — H&P ADULT - PROBLEM SELECTOR PLAN 2
SBP 160s, will continue HOME MEDS: Norvasc 5mg PO daily, HCTZ 12.5mg PO daily and start Metoprolol Tartrate 25mg PO BID. SBP 140s- 160s, will continue HOME MEDS: Norvasc 2.5mg PO qAM, Norvasc 5mg PO qhs and start Metoprolol Tartrate 25mg PO BID.  --HOLDING HOME: HCTZ 12.5mg PO daily for now. SBP 140s- 160s, will continue HOME MEDS: Norvasc 2.5mg PO qAM, Norvasc 5mg PO qhs and start Metoprolol Tartrate 12.5mg PO BID.  --HOLDING HOME: HCTZ 12.5mg PO daily for now.

## 2023-06-28 NOTE — DIETITIAN INITIAL EVALUATION ADULT - NS FNS DIET ORDER
Diet, NPO:   NPO for Procedure/Test     NPO Start Date: 28-Jun-2023,   NPO Start Time: 00:00  Except Medications (06-28-23 @ 03:16)

## 2023-06-28 NOTE — DIETITIAN INITIAL EVALUATION ADULT - PROBLEM SELECTOR PLAN 2
SBP 140s- 160s, will continue HOME MEDS: Norvasc 2.5mg PO qAM, Norvasc 5mg PO qhs and start Metoprolol Tartrate 12.5mg PO BID.  --HOLDING HOME: HCTZ 12.5mg PO daily for now.

## 2023-06-28 NOTE — PATIENT PROFILE ADULT - FALL HARM RISK - HARM RISK INTERVENTIONS

## 2023-06-28 NOTE — DISCHARGE NOTE PROVIDER - NSDCMRMEDTOKEN_GEN_ALL_CORE_FT
Allegra 60 mg oral tablet: 1 tab(s) orally once a day  amLODIPine 2.5 mg oral tablet: 1 tab(s) orally once a day  amLODIPine 5 mg oral tablet: 1 tab(s) orally once a day (at bedtime)  atorvastatin 10 mg oral tablet: 1 tab(s) orally once a day (at bedtime)  D3 25 mcg (1000 intl units) oral tablet: 1 tab(s) orally once a day  hydroCHLOROthiazide 12.5 mg oral tablet: 1 tab(s) orally once a day  Klor-Con 10 mEq oral tablet, extended release: 1 tab(s) orally once a day  Multiple Vitamins oral tablet: 1 tab(s) orally once a day  pantoprazole 40 mg oral delayed release tablet: 1 tab(s) orally once a day  Pepcid 20 mg oral tablet: 1 tab(s) orally once a day  Premarin 0.3 mg oral tablet: 1 tab(s) orally once a day   amLODIPine 2.5 mg oral tablet: 1 tab(s) orally once a day  amLODIPine 5 mg oral tablet: 1 tab(s) orally once a day (at bedtime)  aspirin 81 mg oral delayed release tablet: 1 tab(s) orally once a day  atorvastatin 10 mg oral tablet: 1 tab(s) orally once a day (at bedtime)  D3 25 mcg (1000 intl units) oral tablet: 1 tab(s) orally once a day  hydroCHLOROthiazide 12.5 mg oral tablet: 1 tab(s) orally once a day  Klor-Con 10 mEq oral tablet, extended release: 1 tab(s) orally once a day  metoprolol succinate 25 mg oral capsule, extended release: 1 cap(s) orally once a day  Multiple Vitamins oral tablet: 1 tab(s) orally once a day  pantoprazole 40 mg oral delayed release tablet: 1 tab(s) orally once a day  Pepcid 20 mg oral tablet: 1 tab(s) orally once a day  Premarin 0.3 mg oral tablet: 1 tab(s) orally once a day   aspirin 81 mg oral delayed release tablet: 1 tab(s) orally once a day  atorvastatin 10 mg oral tablet: 1 tab(s) orally once a day (at bedtime)  D3 25 mcg (1000 intl units) oral tablet: 1 tab(s) orally once a day  hydroCHLOROthiazide 12.5 mg oral tablet: 1 tab(s) orally once a day  Klor-Con 10 mEq oral tablet, extended release: 1 tab(s) orally once a day  losartan 25 mg oral tablet: 1 tab(s) orally once a day  metoprolol succinate 25 mg oral capsule, extended release: 1 cap(s) orally once a day  Multiple Vitamins oral tablet: 1 tab(s) orally once a day  pantoprazole 40 mg oral delayed release tablet: 1 tab(s) orally once a day

## 2023-06-28 NOTE — DISCHARGE NOTE NURSING/CASE MANAGEMENT/SOCIAL WORK - PATIENT PORTAL LINK FT
You can access the FollowMyHealth Patient Portal offered by [facility] by registering at the following website: [email or web address]. By joining [employer]’s FollowMyHealth portal, you will also be able to view your health information using other applications (apps) compatible with our system.

## 2023-06-28 NOTE — DISCHARGE NOTE PROVIDER - NSDCCPCAREPLAN_GEN_ALL_CORE_FT
PRINCIPAL DISCHARGE DIAGNOSIS  Diagnosis: Takotsubo cardiomyopathy  Assessment and Plan of Treatment: -Takotsubo symptoms are indistinguishable from those of a heart attack. And an electrocardiogram (ECG) may show abnormalities similar to those found in some heart attacks — in particular, changes known as ST-segment elevation. Cardiac blood tests called troponin may be elevated in this setting. Consequently, imaging studies and other measures are needed to rule out a heart attack.   -There was no significant blockages in the coronary arteries on coronary angiogram — the most common cause of heart attacks.  An echocardiogram (ultrasound image) or other imaging technique that shows abnormal movements in the walls of the left ventricle. The most common abnormality in takotsubo cardiomyopathy — the one that gives the disorder its name — is ballooning of the lower part of the left ventricle (apex). During contraction (systole), this bulging ventricle resembles a tako-tsubo, a pot used by [place] fishermen to trap octopuses. Another term for the disorder is apical ballooning syndrome.  Takotsubo is usually a temporary condition. Many people with takotsubo cardiomyopathy make a full recovery and their heart will return to normal within a few weeks, although it may take longer.   You will have to see your cardiologist to make sure your heart is recovering. If you were prescribed medication to treat your takotsubo symptoms, you may want to ask your cardiologist how long you will need to take them. You can also discuss any concerns or questions you might have.      SECONDARY DISCHARGE DIAGNOSES  Diagnosis: Hyperlipidemia  Assessment and Plan of Treatment: Your cholesterol panel is abnormal. Your LDL is 62 mg/dL and your goal LDL is less than 55 mg/dL. LDL is also known as "bad cholesterol" because it takes cholesterol to your arteries, where it may collect in artery walls. Too much cholesterol in your arteries may lead to a buildup of plaque known as atherosclerosis and can cause heart disease.  You can lower your LDL with medications and lifestyle modifications such as weight loss in overweight patients, aerobic exercise, and eating diets lower in saturated fats  -Your goal HDL is greater than 50 mg/dL. The higher the HDL the better; HDL is known as “good cholesterol” because it transports cholesterol to your liver to be expelled from your body.      Diagnosis: GERD (gastroesophageal reflux disease)  Assessment and Plan of Treatment: Acid reflux occurs when the sphincter muscle at the lower end of your esophagus relaxes at the wrong time, allowing stomach acid to back up into your esophagus. This can cause heartburn and other signs and symptoms. Frequent or constant reflux can lead to GERD .    Diagnosis: Hypertension  Assessment and Plan of Treatment: Hypertension, commonly called high blood pressure, is when the force of blood pumping through your arteries is too strong. Hypertension forces your heart to work harder to pump blood. Your arteries may become narrow or stiff. Having untreated or uncontrolled hypertension for a long period of time can cause heart attack, stroke, kidney disease, and other problems.   Please continue to taking ***  Maintain a healthy lifestyle and follow up with your primary care physician. For blood pressures at home that are too high or low please see your doctor or go to the Emergency Room as necessary.       PRINCIPAL DISCHARGE DIAGNOSIS  Diagnosis: Takotsubo cardiomyopathy  Assessment and Plan of Treatment: -Takotsubo symptoms are indistinguishable from those of a heart attack. And an electrocardiogram (ECG) may show abnormalities similar to those found in some heart attacks — in particular, changes known as ST-segment elevation. Cardiac blood tests called troponin may be elevated in this setting. Consequently, imaging studies and other measures are needed to rule out a heart attack.   -There was no significant blockages in the coronary arteries on coronary angiogram — the most common cause of heart attacks.  An echocardiogram (ultrasound image) or other imaging technique that shows abnormal movements in the walls of the left ventricle. The most common abnormality in takotsubo cardiomyopathy — the one that gives the disorder its name — is ballooning of the lower part of the left ventricle (apex). During contraction (systole), this bulging ventricle resembles a tako-tsubo, a pot used by [employer] fishermen to trap octopuses. Another term for the disorder is apical ballooning syndrome.  Takotsubo is usually a temporary condition. Many people with takotsubo cardiomyopathy make a full recovery and their heart will return to normal within a few weeks, although it may take longer.   You will have to see your cardiologist to make sure your heart is recovering. If you were prescribed medication to treat your takotsubo symptoms, you may want to ask your cardiologist how long you will need to take them. You can also discuss any concerns or questions you might have.  You were prescribed metoprolol succinate 25 mg po daily and losartan 25 mg po daily to help heart function improve. Please take these medications and discontinue home amlodipine (morning and night dose).      SECONDARY DISCHARGE DIAGNOSES  Diagnosis: Hyperlipidemia  Assessment and Plan of Treatment: Your cholesterol panel is abnormal. Your LDL is 62 mg/dL and your goal LDL is less than 55 mg/dL. LDL is also known as "bad cholesterol" because it takes cholesterol to your arteries, where it may collect in artery walls. Too much cholesterol in your arteries may lead to a buildup of plaque known as atherosclerosis and can cause heart disease.  You can lower your LDL with medications and lifestyle modifications such as weight loss in overweight patients, aerobic exercise, and eating diets lower in saturated fats  -Your goal HDL is greater than 50 mg/dL. The higher the HDL the better; HDL is known as “good cholesterol” because it transports cholesterol to your liver to be expelled from your body.  Continue atorvastatin 10 mg po daily.      Diagnosis: GERD (gastroesophageal reflux disease)  Assessment and Plan of Treatment: Acid reflux occurs when the sphincter muscle at the lower end of your esophagus relaxes at the wrong time, allowing stomach acid to back up into your esophagus. This can cause heartburn and other signs and symptoms. Frequent or constant reflux can lead to GERD .    Diagnosis: Hypertension  Assessment and Plan of Treatment: Hypertension, commonly called high blood pressure, is when the force of blood pumping through your arteries is too strong. Hypertension forces your heart to work harder to pump blood. Your arteries may become narrow or stiff. Having untreated or uncontrolled hypertension for a long period of time can cause heart attack, stroke, kidney disease, and other problems.   Please continue to taking ***  Maintain a healthy lifestyle and follow up with your primary care physician. For blood pressures at home that are too high or low please see your doctor or go to the Emergency Room as necessary.  You were prescribed metoprolol succinate 25 mg oral daily and losartan 25 mg oral daily. Please take these medications and discontinue home amlodipine (morning and night dose). You may continue home hydrochlorothiazide medication.       Diagnosis: CAD (coronary artery disease)  Assessment and Plan of Treatment: -You underwent a cardiac catheterization which revealed nonobstructive heart disease. You must be on aspirin 81 mg po daily that was prescribed to your pharmacy.  The procedure was done through your wrist. You do not need to keep this area covered and you may shower. Please avoid any heavy lifting  (no more than 3 to 5 lbs) or strenuous activity for five days. If you develop any swelling, bleeding, hardening of the skin (hematoma formation), acute pain, numbness/tingling  in your arm or leg please contact your doctor immediately or call our 24/7 line: [phone]   Please return to the hospital/seek immediate medical attention if worsening of symptoms- including not limited to chest pain, shortness of breath. Please follow up with Dr. Molina within 7-10 days.   Please call his office and make an appointment to see him. Please continue a heart healthy diet low in sodium, cholesterol, and fat.

## 2023-06-28 NOTE — ED PROVIDER NOTE - PROGRESS NOTE DETAILS
dr chowdary contacted. updated and agrees w/ plan for admission, however currently on vacation and pt should be admitted to service. cards contacted. reviewed ekg changes. NO concern for stemi/q waves at this time. okay to start heparin ivp/gtt and admit to cards/tele under dr del valle. full capacity. NO absolute contraindications for AC. understands risks/benefits. pt consenting to AC. cards contacted. reviewed ekgs. confirms NO concern for stemi/q waves at this time. okay to start heparin ivp/gtt and admit to cards/tele under dr del valle.

## 2023-06-28 NOTE — H&P ADULT - PROBLEM SELECTOR PLAN 3
on Atorvastatin 10mg PO qhs at home, will increase to Atorvastatin 80mg PO qhs in setting NSTEMI.   --F/U Lipid panel.

## 2023-06-28 NOTE — ED PROVIDER NOTE - CLINICAL SUMMARY MEDICAL DECISION MAKING FREE TEXT BOX
hx obtained from pt, daughter and chart review. avss. nontoxic. NAD. no systemic sx. found to have cp likely 2/2 nstemi vs takutsobu. no acute resp distress. no leukocytosis vs significant anemia vs electrolyte abnl. hs-trop 540>698. ekg nonischemic x2. cxr w/o acute focal consol vs ptx vs pulm edema vs widened mediastinum. no concern for stemi vs pe vs dissection vs ptx at this time. s/p heparin. see progress note. dr chowdary contacted. updated and agrees w/ plan for admission, however currently on vacation and pt should be admitted to service. cards contacted. reviewed ekgs. confirms NO concern for stemi/q waves at this time. okay to start heparin ivp/gtt and admit to cards/tele under dr del valle. hx obtained from pt, daughter and chart review. avss. nontoxic. NAD. no systemic sx. found to have cp likely 2/2 nstemi vs takutsobu. no acute resp distress. no leukocytosis vs significant anemia vs electrolyte abnl. hs-trop 540>698. ekg nonischemic x2. cxr w/o acute focal consol vs ptx vs pulm edema vs widened mediastinum. no concern for stemi vs pe vs dissection vs ptx at this time. s/p asa/heparin. see progress note. dr chowdary contacted. updated and agrees w/ plan for admission, however currently on vacation and pt should be admitted to service. cards contacted. reviewed ekgs. confirms NO concern for stemi/q waves at this time. okay to start heparin ivp/gtt and admit to cards/tele under dr del valle.

## 2023-06-28 NOTE — DISCHARGE NOTE NURSING/CASE MANAGEMENT/SOCIAL WORK - NSDCPEFALRISK_GEN_ALL_CORE
For information on Fall & Injury Prevention, visit: [email or web address] OR  [email or web address] OR  https://www.cdc.gov/steadi/patient.html

## 2023-06-28 NOTE — H&P ADULT - PROBLEM SELECTOR PLAN 1
currently chest pain free, initial EKG revealed NSR@80BPM with q waves in precordial leads. Repeat EKG: NSR@75BPM with q waves in precordial leads, and new TWI AVL and V2.  --Troponin T (high sensitivity) 540 with repeat 698, CKMB 20.6, .  --patient loaded with ASA 325mg PO x 1 dose, Plavix 600mg PO x 1 dose and started on a Heparin gtt as per ACS protocol in ED.  --will continue ASA/Plavix/Statin and initiate BB.   --obtain TTE in AM for structural assessment.  --NPO for ischemic evaluation. Initial EKG revealed NSR@80BPM with q waves in precordial leads. Repeat EKG: NSR@75BPM with q waves in precordial leads, and new TWI AVL and V2.  --Troponin T (high sensitivity) 540 with repeat 698, CKMB 20.6, .  --patient loaded with ASA 325mg PO x 1 dose, Plavix 600mg PO x 1 dose and started on a Heparin gtt as per ACS protocol in ED.  ***While in ER holding c/o worsening SSCP 8/10 in severity, STAT repeat EKG (3rd): SR@75BPM with q waves in precordial leads and new ST elevations in Lead III and AVF. CCU fellow contacted, performed bedside limited TTE.  --Interventional cardiologist Dr. Herr notified. 4th EKG w/ resolution of ST changes in inferior leads. Pt NPO precath/consented for 1st case cardiac catheterization this AM.   --will continue ASA/Plavix/Statin and initiate BB.   --obtain TTE in AM for structural assessment.

## 2023-06-28 NOTE — DIETITIAN INITIAL EVALUATION ADULT - PERSON TAUGHT/METHOD
Tips for GI distress provided - spoke about typical gastric irritants. Pt declined further education today./verbal instruction/patient instructed

## 2023-06-28 NOTE — DISCHARGE NOTE PROVIDER - PROVIDER TOKENS
PROVIDER:[TOKEN:[[id number]],FOLLOWUP:[1 week],ESTABLISHEDPATIENT:[T]] PROVIDER:[TOKEN:[[id number]],SCHEDULEDAPPT:[07/06/2023],SCHEDULEDAPPTTIME:[09:00 AM],ESTABLISHEDPATIENT:[T]]

## 2023-06-28 NOTE — DIETITIAN INITIAL EVALUATION ADULT - PROBLEM SELECTOR PLAN 4
continue HOME MED: continue Pantoprazole 40mg PO daily and Pepcid 20mg PO daily.      N: NPO except medications  E: Maintain K>4.0 and Mg>2.0  VTE PPx: on Heparin gtt  Code: Full

## 2023-06-28 NOTE — DIETITIAN INITIAL EVALUATION ADULT - PROBLEM SELECTOR PLAN 1
Initial EKG revealed NSR@80BPM with q waves in precordial leads. Repeat EKG: NSR@75BPM with q waves in precordial leads, and new TWI AVL and V2.  --Troponin T (high sensitivity) 540 with repeat 698, CKMB 20.6, .  --patient loaded with ASA 325mg PO x 1 dose, Plavix 600mg PO x 1 dose and started on a Heparin gtt as per ACS protocol in ED.  ***While in ER holding c/o worsening SSCP 8/10 in severity, STAT repeat EKG (3rd): SR@75BPM with q waves in precordial leads and new ST elevations in Lead III and AVF. CCU fellow contacted, performed bedside limited TTE.  --Interventional cardiologist Dr. Herr notified. 4th EKG w/ resolution of ST changes in inferior leads. Pt NPO precath/consented for 1st case cardiac catheterization this AM.   --will continue ASA/Plavix/Statin and initiate BB.   --obtain TTE in AM for structural assessment.

## 2023-06-28 NOTE — DISCHARGE NOTE PROVIDER - HOSPITAL COURSE
77 yr old F, former ETOH abuse (quit 1/2020), with PMHx of HTN, hyperlipidemia, GERD who presents to [facility] ED 6/27/23 c/o sudden onset nonradiating substernal crushing CP 7-8/10 in severity after an argument with HHA this evening. Patient denies any N/V, diaphoresis, palpitations, dizziness, PND, orthopnea, LE edema, recent travel or sick contacts. Patient states symptoms have been waxing and waning for the past 6 hours.  Patient admits to excessive emotional stress over the past year as her husbands health has suddenly declined and now peaked as her long standing HHA will no longer be working with them. Prior CCTA 5/17/2022: revealed Calcium score 11, mild LAD lesion with calcified and non-calcified plaque.     In ED, BP: 160s/70s, HR: 80s, RR: 16, Temp: 98.5F, O2 sat: 95% RA. Initial EKG revealed NSR@80BPM with q waves in precordial leads. Repeat EKG: NSR@75BPM with q waves in precordial leads, and new TWI AVL and V2.  CXR unremarkable.Labs notable for: WBC 11.02 without significant shift, K 3.3, Troponin T (high sensitivity) 540 with repeat 698, CKMB 20.6, . Patient R/I NSTEMI, given ASA 325mg PO x 1 dose, Plavix 600mg PO x 1 dose and started on Heparin gtt as per ACS protocol. Patient now admitted to cardiac telemetry for management of NSTEMI and further ischemic work-up.     Patient subsequently underwent cardiac angiogram on 06/28/2023 revealing: nonobstructive CAD _____    Pt is asymptomatic at this time and denies chest pain, SOB, JAMES, palpitations, dizziness, LOC, N/V, diaphoresis, orthopnea/PND, and leg swelling. Pt able to ambulate and void without complication. VSS. Labs and telemetry reviewed. ___________ access site stable and dressing C/D/I.  Pt is a candidate for discharge per . ________. Pt given appropriate discharge instructions, pt states they have an appropriate amount of their previous home meds unchanged from this visit at home, and any new medications were sent to their pharmacy. Pt instructed to f/u with ________ in 1-2 weeks. 77 yr old F, former ETOH abuse (quit 1/2020), with PMHx of HTN, hyperlipidemia, GERD who presents to [facility] ED 6/27/23 c/o sudden onset non-radiating substernal crushing CP 7-8/10 in severity after an argument with HHA this evening. Patient denies any N/V, diaphoresis, palpitations, dizziness, PND, orthopnea, LE edema, recent travel or sick contacts. Patient states symptoms have been waxing and waning for the past 6 hours.  Patient admits to excessive emotional stress over the past year as her husbands health has suddenly declined and now peaked as her long standing HHA will no longer be working with them. Prior CCTA 5/17/2022: revealed Calcium score 11, mild LAD lesion with calcified and non-calcified plaque.     In ED, BP: 160s/70s, HR: 80s, RR: 16, Temp: 98.5F, O2 sat: 95% RA. Initial EKG revealed NSR@80BPM with q waves in precordial leads. Repeat EKG: NSR@75BPM with q waves in precordial leads, and new TWI AVL and V2.  CXR unremarkable.Labs notable for: WBC 11.02 without significant shift, K 3.3, Troponin T (high sensitivity) 540 with repeat 698, CKMB 20.6, . Patient R/I NSTEMI, given ASA 325mg PO x 1 dose, Plavix 600mg PO x 1 dose and started on Heparin gtt as per ACS protocol. Patient now admitted to cardiac telemetry for management of NSTEMI and further ischemic work-up.     Patient subsequently underwent cardiac angiogram on 06/28/2023 revealing nonobstructive CAD. Echocardiogram revealed:     Pt is asymptomatic at this time and denies chest pain, SOB, JAMES, palpitations, dizziness, LOC, N/V, diaphoresis, orthopnea/PND, and leg swelling. Pt able to ambulate and void without complication. VSS. Labs and telemetry reviewed. Wrist access site stable and dressing C/D/I.  Pt is a candidate for discharge per Dr. Choudhury. Pt given appropriate discharge instructions, pt states they have an appropriate amount of their previous home meds unchanged from this visit at home, and any new medications were sent to their pharmacy. Pt instructed to f/u with cardiologist Dr. Molina in 1-2 weeks.     77 yr old F, former ETOH abuse (quit 1/2020), with PMHx of HTN, hyperlipidemia, GERD who presents to [facility] ED 6/27/23 c/o sudden onset non-radiating substernal crushing CP 7-8/10 in severity after an argument with HHA this evening. Patient denies any N/V, diaphoresis, palpitations, dizziness, PND, orthopnea, LE edema, recent travel or sick contacts. Patient states symptoms have been waxing and waning for the past 6 hours.  Patient admits to excessive emotional stress over the past year as her husbands health has suddenly declined and now peaked as her long standing HHA will no longer be working with them. Prior CCTA 5/17/2022: revealed Calcium score 11, mild LAD lesion with calcified and non-calcified plaque.     In ED, BP: 160s/70s, HR: 80s, RR: 16, Temp: 98.5F, O2 sat: 95% RA. Initial EKG revealed NSR@80BPM with q waves in precordial leads. Repeat EKG: NSR@75BPM with q waves in precordial leads, and new TWI AVL and V2.  CXR unremarkable.Labs notable for: WBC 11.02 without significant shift, K 3.3, Troponin T (high sensitivity) 540 with repeat 698, CKMB 20.6, . Patient R/I NSTEMI, given ASA 325mg PO x 1 dose, Plavix 600mg PO x 1 dose and started on Heparin gtt as per ACS protocol. Patient now admitted to cardiac telemetry for management of NSTEMI and further ischemic work-up.     Patient subsequently underwent cardiac angiogram on 06/28/2023 revealing nonobstructive CAD. Echocardiogram revealed     Pt is asymptomatic at this time and denies chest pain, SOB, JAMES, palpitations, dizziness, LOC, N/V, diaphoresis, orthopnea/PND, and leg swelling. Pt able to ambulate and void without complication. VSS. Labs and telemetry reviewed. Wrist access site stable and dressing C/D/I.  Pt is a candidate for discharge per Dr. Choudhury. Pt given appropriate discharge instructions, pt states they have an appropriate amount of their previous home meds unchanged from this visit at home, and any new medications were sent to their pharmacy. Pt instructed to f/u with cardiologist Dr. Molina in 1-2 weeks.     77 yr old F, former ETOH abuse (quit 1/2020), with PMHx of HTN, hyperlipidemia, GERD who presents to [facility] ED 6/27/23 c/o sudden onset non-radiating substernal crushing CP 7-8/10 in severity after an argument with HHA this evening. Patient denies any N/V, diaphoresis, palpitations, dizziness, PND, orthopnea, LE edema, recent travel or sick contacts. Patient states symptoms have been waxing and waning for the past 6 hours.  Patient admits to excessive emotional stress over the past year as her husbands health has suddenly declined and now peaked as her long standing HHA will no longer be working with them. Prior CCTA 5/17/2022: revealed Calcium score 11, mild LAD lesion with calcified and non-calcified plaque.     In ED, BP: 160s/70s, HR: 80s, RR: 16, Temp: 98.5F, O2 sat: 95% RA. Initial EKG revealed NSR@80BPM with q waves in precordial leads. Repeat EKG: NSR@75BPM with q waves in precordial leads, and new TWI AVL and V2.  CXR unremarkable.Labs notable for: WBC 11.02 without significant shift, K 3.3, Troponin T (high sensitivity) 540 with repeat 698, CKMB 20.6, . Patient R/I NSTEMI, given ASA 325mg PO x 1 dose, Plavix 600mg PO x 1 dose and started on Heparin gtt as per ACS protocol. Patient now admitted to cardiac telemetry for management of NSTEMI and further ischemic work-up. Hypokalemic and hypomagnesemic in which given adequate repletion in ED.     Patient subsequently underwent cardiac angiogram on 06/28/2023 revealing nonobstructive CAD. Echocardiogram 06/28/23 revealed mildly reduced LV systolic function, regional wall motion abnormalities c/w ischemic heart disease, normal RV size and systolic function, normal atria, mild-mod TR, aortic sclerosis without significant stenosis.     Pt is asymptomatic at this time and denies chest pain, SOB, JAMES, palpitations, dizziness, LOC, N/V, diaphoresis, orthopnea/PND, and leg swelling. Pt able to ambulate and void without complication. VSS. Labs and telemetry reviewed. Wrist access site stable and dressing C/D/I.  Pt is a candidate for discharge per Dr. Choudhury. Pt given appropriate discharge instructions, pt states they have an appropriate amount of their previous home meds unchanged from this visit at home, and any new medications were sent to their pharmacy. Pt instructed to f/u with cardiologist Dr. Molina in 1-2 weeks.

## 2023-06-28 NOTE — DIETITIAN INITIAL EVALUATION ADULT - PERTINENT MEDS FT
MEDICATIONS  (STANDING):  amLODIPine   Tablet 2.5 milliGRAM(s) Oral daily  amLODIPine   Tablet 5 milliGRAM(s) Oral at bedtime  atorvastatin 80 milliGRAM(s) Oral at bedtime  cholecalciferol 1000 Unit(s) Oral daily  famotidine    Tablet 20 milliGRAM(s) Oral daily  loratadine 10 milliGRAM(s) Oral daily  metoprolol tartrate 12.5 milliGRAM(s) Oral two times a day  multivitamin 1 Tablet(s) Oral daily  pantoprazole    Tablet 40 milliGRAM(s) Oral before breakfast  sodium chloride 0.9%. 1000 milliLiter(s) (150 mL/Hr) IV Continuous <Continuous>    MEDICATIONS  (PRN):

## 2023-06-28 NOTE — H&P ADULT - NSHPLABSRESULTS_GEN_ALL_CORE
initial EKG revealed NSR@80BPM with q waves in precordial leads. Repeat EKG: NSR@75BPM with q waves in precordial leads, and new TWI AVL and V2. Initial EKG revealed NSR@80BPM with q waves in precordial leads.   Repeat EKG: NSR@75BPM with q waves in precordial leads, and new TWI AVL and V2.

## 2023-06-29 ENCOUNTER — TRANSCRIPTION ENCOUNTER (OUTPATIENT)
Age: 78
End: 2023-06-29

## 2023-06-29 ENCOUNTER — APPOINTMENT (OUTPATIENT)
Dept: CARE COORDINATION | Facility: HOME HEALTH | Age: 78
End: 2023-06-29
Payer: MEDICARE

## 2023-06-29 PROBLEM — E78.5 HYPERLIPIDEMIA, UNSPECIFIED: Chronic | Status: ACTIVE | Noted: 2023-06-28

## 2023-06-29 PROBLEM — K21.9 GASTRO-ESOPHAGEAL REFLUX DISEASE WITHOUT ESOPHAGITIS: Chronic | Status: ACTIVE | Noted: 2023-06-28

## 2023-06-29 PROCEDURE — 99495 TRANSJ CARE MGMT MOD F2F 14D: CPT | Mod: 95

## 2023-06-29 RX ORDER — MEDROXYPROGESTERONE ACETATE 10 MG/1
10 TABLET ORAL
Refills: 0 | Status: COMPLETED | COMMUNITY
End: 2023-06-29

## 2023-06-29 RX ORDER — AMLODIPINE BESYLATE 5 MG/1
5 TABLET ORAL
Qty: 90 | Refills: 3 | Status: COMPLETED | COMMUNITY
End: 2023-06-29

## 2023-06-29 RX ORDER — HYDROCHLOROTHIAZIDE 12.5 MG/1
12.5 TABLET ORAL DAILY
Qty: 90 | Refills: 3 | Status: COMPLETED | COMMUNITY
End: 2023-06-29

## 2023-06-29 RX ORDER — FEXOFENADINE HCL 60 MG
60 CAPSULE ORAL
Refills: 0 | Status: COMPLETED | COMMUNITY
End: 2023-06-29

## 2023-06-29 RX ORDER — AMLODIPINE BESYLATE 2.5 MG/1
2.5 TABLET ORAL DAILY
Qty: 90 | Refills: 3 | Status: COMPLETED | COMMUNITY
End: 2023-06-29

## 2023-06-29 RX ORDER — ESTROGENS, CONJUGATED 0.3 MG/1
0.3 TABLET, FILM COATED ORAL DAILY
Refills: 0 | Status: COMPLETED | COMMUNITY
End: 2023-06-29

## 2023-06-29 RX ORDER — MULTIVIT-MIN/FOLIC/VIT K/LYCOP 400-300MCG
25 MCG TABLET ORAL DAILY
Refills: 0 | Status: COMPLETED | COMMUNITY
End: 2023-06-29

## 2023-06-29 NOTE — ASSESSMENT
[FreeTextEntry1] : Patient is a 77 year old female enrolled in the [facility] program with a history of  HTN, hyperlipidemia, GERD. Patient was admitted from 6/28/23 to [facility] for a diagnosis of NSTEMI. \par \[facility] chart reviewed and copied as per [facility] Discharge Summary:\par "77 yr old F, former ETOH abuse (quit 1/2020), with PMHx of HTN, hyperlipidemia, GERD who presents to [facility] ED 6/27/23 c/o sudden onset non-radiating substernal crushing CP 7-8/10 in severity after an argument with HHA this evening. Patient denies any N/V, diaphoresis, palpitations, dizziness, PND, orthopnea, LE edema, recent travel or sick contacts. Patient states symptoms have been waxing and waning for the past 6 hours. Patient admits to excessive emotional stress over the past year as her husbands health has suddenly declined and now peaked as her long standing HHA will no longer be working with them. Prior CCTA 5/17/2022: revealed Calcium score 11, mild LAD lesion with calcified and non-calcified plaque. In ED, BP: 160s/70s, HR: 80s, RR: 16, Temp: 98.5F, O2 sat: 95% RA. Initial EKG revealed NSR@80BPM with q waves in precordial leads. Repeat EKG: NSR@75BPM with q waves in precordial leads, and new TWI AVL and V2. CXR unremarkable.Labs notable for: WBC 11.02 without significant shift, K 3.3, Troponin T (high sensitivity) 540 with repeat 698, CKMB 20.6, . Patient R/I NSTEMI, given ASA 325mg PO x 1 dose, Plavix 600mg PO x 1 dose and started on Heparin gtt as per ACS protocol. Patient now admitted to cardiac telemetry for management of NSTEMI and further ischemic work-up. Hypokalemic and hypomagnesemic in which given adequate repletion in ED. \par Patient subsequently underwent cardiac angiogram on 06/28/2023 revealing nonobstructive CAD. Echocardiogram 06/28/23 revealed mildly reduced LV systolic function, regional wall motion abnormalities c/w ischemic heart disease, normal RV size and systolic function, normal atria, mild-mod TR, aortic sclerosis without significant stenosis. Pt is asymptomatic at this time and denies chest pain, SOB, JAMES, palpitations, dizziness, LOC, N/V, diaphoresis, orthopnea/PND, and leg swelling. Pt able to ambulate and void without complication. VSS. Labs and telemetry reviewed. Wrist access site stable and dressing C/D/I. Pt is a candidate for discharge per Dr. Choudhury. Pt given appropriate discharge instructions, pt states they have an appropriate amount of their previous home meds unchanged from this visit at home, and any new medications were sent to their pharmacy. Pt instructed to f/u with cardiologist Dr. Molina in 1-2 weeks". \par \par Patient observed via tele health and is alert and oriented x 3, in no acute distress. Patient states they are feeling well today. Patient states they are eating and drinking well. Patient reports compliance with medications and states she is waiting for remaining medications to be delivered today. Patient reports she has f/u with her cardiologist  on 7/6/23. Patient reports she worries and stresses over her 's health as he has a chronic ad progressive disease. 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 Denies any cough, fever, chills, abdominal pain, palpitations, nausea, vomiting, diarrhea, lightheadedness, dizziness, shortness of breath, or chest pain.\par \par Patient contacted by TCM RN within 48 hours of discharge and d/c instructions reviewed.  Discharge medications were reviewed and reconciled with the current medication list and medications in home. Patient had 48 hour follow up call done by RODOLFO Che on 6/29/23.\par \par

## 2023-06-29 NOTE — HISTORY OF PRESENT ILLNESS
[Home] : at home, [unfilled] , at the time of the visit. [Other Location: e.g. Home (Enter Location, City,State)___] : at [unfilled] [Verbal consent obtained from patient] : the patient, [unfilled] [FreeTextEntry1] : Follow-up for discharge from [facility] for NSTEMI.\par  [de-identified] : Patient is a 77 year old female enrolled in the [facility] program with a history of  HTN, hyperlipidemia, GERD. Patient was admitted from 6/28/23 to [facility] for a diagnosis of NSTEMI. \par \[facility] chart reviewed and copied as per [facility] Discharge Summary:\par "77 yr old F, former ETOH abuse (quit 1/2020), with PMHx of HTN, hyperlipidemia, GERD who presents to [facility] ED 6/27/23 c/o sudden onset non-radiating substernal crushing CP 7-8/10 in severity after an argument with HHA this evening. Patient denies any N/V, diaphoresis, palpitations, dizziness, PND, orthopnea, LE edema, recent travel or sick contacts. Patient states symptoms have been waxing and waning for the past 6 hours. Patient admits to excessive emotional stress over the past year as her husbands health has suddenly declined and now peaked as her long standing HHA will no longer be working with them. Prior CCTA 5/17/2022: revealed Calcium score 11, mild LAD lesion with calcified and non-calcified plaque. In ED, BP: 160s/70s, HR: 80s, RR: 16, Temp: 98.5F, O2 sat: 95% RA. Initial EKG revealed NSR@80BPM with q waves in precordial leads. Repeat EKG: NSR@75BPM with q waves in precordial leads, and new TWI AVL and V2. CXR unremarkable.Labs notable for: WBC 11.02 without significant shift, K 3.3, Troponin T (high sensitivity) 540 with repeat 698, CKMB 20.6, . Patient R/I NSTEMI, given ASA 325mg PO x 1 dose, Plavix 600mg PO x 1 dose and started on Heparin gtt as per ACS protocol. Patient now admitted to cardiac telemetry for management of NSTEMI and further ischemic work-up. Hypokalemic and hypomagnesemic in which given adequate repletion in ED. \par Patient subsequently underwent cardiac angiogram on 06/28/2023 revealing nonobstructive CAD. Echocardiogram 06/28/23 revealed mildly reduced LV systolic function, regional wall motion abnormalities c/w ischemic heart disease, normal RV size and systolic function, normal atria, mild-mod TR, aortic sclerosis without significant stenosis. Pt is asymptomatic at this time and denies chest pain, SOB, JAMES, palpitations, dizziness, LOC, N/V, diaphoresis, orthopnea/PND, and leg swelling. Pt able to ambulate and void without complication. VSS. Labs and telemetry reviewed. Wrist access site stable and dressing C/D/I. Pt is a candidate for discharge per Dr. Choudhury. Pt given appropriate discharge instructions, pt states they have an appropriate amount of their previous home meds unchanged from this visit at home, and any new medications were sent to their pharmacy. Pt instructed to f/u with cardiologist Dr. Molina in 1-2 weeks". \par \par Patient observed via tele health and is alert and oriented x 3, in no acute distress. Patient states they are feeling well today. Patient states they are eating and drinking well. Patient reports compliance with medications and states she is waiting for remaining medications to be delivered today. Patient reports she has f/u with her cardiologist  on 7/6/23. Patient reports she worries and stresses over her 's health as he has a chronic ad progressive disease. 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 Denies any cough, fever, chills, abdominal pain, palpitations, nausea, vomiting, diarrhea, lightheadedness, dizziness, shortness of breath, or chest pain.\par \par Patient contacted by TCM RN within 48 hours of discharge and d/c instructions reviewed.  Discharge medications were reviewed and reconciled with the current medication list and medications in home. Patient had 48 hour follow up call done by RODOLFO Che on 6/29/23.\par \par

## 2023-06-29 NOTE — REVIEW OF SYSTEMS
[Fever] : no fever [Chills] : no chills [Fatigue] : no fatigue [Hot Flashes] : no hot flashes [Night Sweats] : no night sweats [Chest Pain] : no chest pain [Palpitations] : no palpitations [Leg Claudication] : no leg claudication [Lower Ext Edema] : no lower extremity edema [Orthopnea] : no orthopnea [Shortness Of Breath] : no shortness of breath [Wheezing] : no wheezing [Cough] : no cough [Dyspnea on Exertion] : no dyspnea on exertion [Abdominal Pain] : no abdominal pain [Nausea] : no nausea [Constipation] : no constipation [Diarrhea] : diarrhea [Vomiting] : no vomiting [Dysuria] : no dysuria [Incontinence] : no incontinence [Hematuria] : no hematuria [Joint Pain] : no joint pain [Joint Stiffness] : no joint stiffness [Muscle Pain] : no muscle pain [Itching] : no itching [Skin Rash] : no skin rash [Headache] : no headache [Dizziness] : no dizziness [Fainting] : no fainting [Confusion] : no confusion [Memory Loss] : no memory loss [Suicidal] : not suicidal [Insomnia] : no insomnia [Anxiety] : anxiety [Depression] : no depression [Easy Bleeding] : no easy bleeding [Negative] : ENT

## 2023-06-29 NOTE — PHYSICAL EXAM
[No Acute Distress] : no acute distress [Well Nourished] : well nourished [Well Developed] : well developed [Well-Appearing] : well-appearing [Normal Sclera/Conjunctiva] : normal sclera/conjunctiva [Normal Outer Ear/Nose] : the outer ears and nose were normal in appearance [Normal Oropharynx] : the oropharynx was normal [No JVD] : no jugular venous distention [No Respiratory Distress] : no respiratory distress  [No Edema] : there was no peripheral edema [Non-distended] : non-distended [No Joint Swelling] : no joint swelling [No Rash] : no rash [Normal Affect] : the affect was normal [Normal Insight/Judgement] : insight and judgment were intact [de-identified] : limited due to tele visit  [de-identified] : hematoma to right radial wrist

## 2023-07-06 ENCOUNTER — APPOINTMENT (OUTPATIENT)
Dept: HEART AND VASCULAR | Facility: CLINIC | Age: 78
End: 2023-07-06
Payer: MEDICARE

## 2023-07-06 ENCOUNTER — NON-APPOINTMENT (OUTPATIENT)
Age: 78
End: 2023-07-06

## 2023-07-06 VITALS
HEIGHT: 63 IN | BODY MASS INDEX: 24.1 KG/M2 | HEART RATE: 79 BPM | TEMPERATURE: 98.3 F | WEIGHT: 136 LBS | OXYGEN SATURATION: 98 % | DIASTOLIC BLOOD PRESSURE: 60 MMHG | SYSTOLIC BLOOD PRESSURE: 132 MMHG

## 2023-07-06 DIAGNOSIS — I21.4 NON-ST ELEVATION (NSTEMI) MYOCARDIAL INFARCTION: ICD-10-CM

## 2023-07-06 PROCEDURE — 93000 ELECTROCARDIOGRAM COMPLETE: CPT

## 2023-07-06 PROCEDURE — 99214 OFFICE O/P EST MOD 30 MIN: CPT

## 2023-07-06 RX ORDER — FAMOTIDINE 20 MG/1
20 TABLET, FILM COATED ORAL
Qty: 90 | Refills: 3 | Status: ACTIVE | COMMUNITY

## 2023-07-06 RX ORDER — FEXOFENADINE HCL 60 MG
60 TABLET ORAL DAILY
Refills: 0 | Status: ACTIVE | COMMUNITY

## 2023-07-06 RX ORDER — ESTROGENS, CONJUGATED 0.3 MG/1
0.3 TABLET, FILM COATED ORAL DAILY
Refills: 0 | Status: ACTIVE | COMMUNITY

## 2023-07-06 NOTE — REASON FOR VISIT
[FreeTextEntry1] : 76 y/o F with HTN and HLD.  CCS = 11.  Mild LAD dz.  Recent cold and wheezing\par \par 7/6/23   Admitted to [facility] with CP and + trops of 500- 600 and Tw inversions.  Pt reported increase in stress levels and had an emotional outburst..  Cath revealed non-obstructive dz and echo had RWMA.  [facility].  She did not tolerate nitrates due to hypotension. EF is 40-45 % with apical hypokinesia.

## 2023-07-06 NOTE — ASSESSMENT
[FreeTextEntry1] : Raina CM- Under severe stress from husbands Parkinson's disease.  Strategy discussed.  Stress management.  Enough help at home required.\par \par HTN- BP excellent, no changes\par \par HLD- LDL 67, 73 on Lipitor 10 mg\par \par ASHD- CCS = 11, mild LAD dz with calcified and non-calcified plaque\par \par Health Maintanence- Shingrix x 2 2018,  Pneumovax 23 2013, Prevnar 13 2018, Flu shot annually Covid UTD\par \par CA Screening- Mammo 6/2022, colonoscopy 2021

## 2023-07-10 DIAGNOSIS — K21.9 GASTRO-ESOPHAGEAL REFLUX DISEASE WITHOUT ESOPHAGITIS: ICD-10-CM

## 2023-07-10 DIAGNOSIS — I25.84 CORONARY ATHEROSCLEROSIS DUE TO CALCIFIED CORONARY LESION: ICD-10-CM

## 2023-07-10 DIAGNOSIS — I10 ESSENTIAL (PRIMARY) HYPERTENSION: ICD-10-CM

## 2023-07-10 DIAGNOSIS — I51.81 TAKOTSUBO SYNDROME: ICD-10-CM

## 2023-07-10 DIAGNOSIS — I21.4 NON-ST ELEVATION (NSTEMI) MYOCARDIAL INFARCTION: ICD-10-CM

## 2023-07-10 DIAGNOSIS — I25.10 ATHEROSCLEROTIC HEART DISEASE OF NATIVE CORONARY ARTERY WITHOUT ANGINA PECTORIS: ICD-10-CM

## 2023-07-10 DIAGNOSIS — E87.6 HYPOKALEMIA: ICD-10-CM

## 2023-07-10 DIAGNOSIS — E83.42 HYPOMAGNESEMIA: ICD-10-CM

## 2023-07-10 DIAGNOSIS — I25.83 CORONARY ATHEROSCLEROSIS DUE TO LIPID RICH PLAQUE: ICD-10-CM

## 2023-07-10 DIAGNOSIS — E78.5 HYPERLIPIDEMIA, UNSPECIFIED: ICD-10-CM

## 2023-07-11 ENCOUNTER — TRANSCRIPTION ENCOUNTER (OUTPATIENT)
Age: 78
End: 2023-07-11

## 2023-07-14 PROBLEM — I10 ESSENTIAL (PRIMARY) HYPERTENSION: Chronic | Status: ACTIVE | Noted: 2023-06-28

## 2023-07-27 ENCOUNTER — TRANSCRIPTION ENCOUNTER (OUTPATIENT)
Age: 78
End: 2023-07-27

## 2023-09-25 ENCOUNTER — APPOINTMENT (OUTPATIENT)
Dept: HEART AND VASCULAR | Facility: CLINIC | Age: 78
End: 2023-09-25
Payer: MEDICARE

## 2023-09-25 PROCEDURE — 93306 TTE W/DOPPLER COMPLETE: CPT

## 2023-10-02 ENCOUNTER — APPOINTMENT (OUTPATIENT)
Dept: HEART AND VASCULAR | Facility: CLINIC | Age: 78
End: 2023-10-02
Payer: MEDICARE

## 2023-10-02 VITALS
TEMPERATURE: 98.4 F | WEIGHT: 136 LBS | HEIGHT: 63 IN | SYSTOLIC BLOOD PRESSURE: 130 MMHG | HEART RATE: 72 BPM | BODY MASS INDEX: 24.1 KG/M2 | OXYGEN SATURATION: 97 % | DIASTOLIC BLOOD PRESSURE: 68 MMHG

## 2023-10-02 PROCEDURE — 99213 OFFICE O/P EST LOW 20 MIN: CPT

## 2023-10-02 RX ORDER — ASPIRIN 81 MG/1
81 TABLET ORAL
Refills: 0 | Status: DISCONTINUED | COMMUNITY
End: 2023-10-02

## 2024-01-29 ENCOUNTER — NON-APPOINTMENT (OUTPATIENT)
Age: 79
End: 2024-01-29

## 2024-01-29 ENCOUNTER — APPOINTMENT (OUTPATIENT)
Dept: HEART AND VASCULAR | Facility: CLINIC | Age: 79
End: 2024-01-29
Payer: MEDICARE

## 2024-01-29 VITALS
WEIGHT: 136 LBS | SYSTOLIC BLOOD PRESSURE: 132 MMHG | HEART RATE: 75 BPM | TEMPERATURE: 97.7 F | DIASTOLIC BLOOD PRESSURE: 66 MMHG | OXYGEN SATURATION: 98 % | BODY MASS INDEX: 24.1 KG/M2 | HEIGHT: 63 IN

## 2024-01-29 DIAGNOSIS — Z82.49 FAMILY HISTORY OF ISCHEMIC HEART DISEASE AND OTHER DISEASES OF THE CIRCULATORY SYSTEM: ICD-10-CM

## 2024-01-29 DIAGNOSIS — K21.9 GASTRO-ESOPHAGEAL REFLUX DISEASE W/OUT ESOPHAGITIS: ICD-10-CM

## 2024-01-29 PROCEDURE — 99213 OFFICE O/P EST LOW 20 MIN: CPT

## 2024-01-29 PROCEDURE — 93000 ELECTROCARDIOGRAM COMPLETE: CPT

## 2024-01-29 RX ORDER — MEDROXYPROGESTERONE ACETATE 10 MG/1
10 TABLET ORAL DAILY
Refills: 0 | Status: ACTIVE | COMMUNITY

## 2024-01-29 NOTE — REASON FOR VISIT
[FreeTextEntry1] : 76 y/o F with HTN and HLD.  CCS = 11.  Mild LAD dz.  Recent cold and wheezing  7/6/23   Admitted to [facility] with CP and + trops of 500- 600 and Tw inversions.  Pt reported increase in stress levels and had an emotional outburst..  Cath revealed non-obstructive dz and echo had RWMA.  She was DCed as an NSTEMI from [facility].  She did not tolerate nitrates due to hypotension. EF is 40-45 % with apical hypokinesia. 10/2/23 Echo Sept 25, 2023. EF back to NL. Stopping ASA, there was non-obst CAD.  1/29/24 Doing OK.  Caregiver turnover with her  is stressful.

## 2024-01-29 NOTE — HISTORY OF PRESENT ILLNESS
[FreeTextEntry1] : formerly with Dr Manjula Rushing GYN- Dr Sotelo GI- Dr Jaren Miller- Rebekah and Daphne Rooney ([facility]) ENT- Dr Reyes Ophtho- Dr Musa

## 2024-01-29 NOTE — ASSESSMENT
[FreeTextEntry1] : Raina CM- Under severe stress from husbands Parkinson's disease.  Strategy discussed.  Stress management.  Enough help at home required.  Stopping ASA.  EF back to NL.  HTN- BP excellent, no changes  HLD- LDL 67, 73 on Lipitor 10 mg  ASHD- CCS = 11, mild LAD dz with calcified and non-calcified plaque  Health Maintenance- Shingrix x 2 2018,  Pneumovax 23 2013, Prevnar 13 2018, Flu shot annually Covid UTD.  Will give Pneumovax in 2025.  CA Screening- Mammo 6/2023, colonoscopy 2021

## 2024-02-12 RX ORDER — POTASSIUM CHLORIDE 750 MG/1
10 TABLET, FILM COATED, EXTENDED RELEASE ORAL
Qty: 90 | Refills: 3 | Status: ACTIVE | COMMUNITY
Start: 2023-01-26 | End: 1900-01-01

## 2024-05-17 RX ORDER — ATORVASTATIN CALCIUM 10 MG/1
10 TABLET, FILM COATED ORAL
Qty: 90 | Refills: 3 | Status: ACTIVE | COMMUNITY
Start: 1900-01-01 | End: 1900-01-01

## 2024-05-17 RX ORDER — HYDROCHLOROTHIAZIDE 12.5 MG/1
12.5 CAPSULE ORAL
Qty: 90 | Refills: 3 | Status: ACTIVE | COMMUNITY
Start: 2023-01-26 | End: 1900-01-01

## 2024-05-19 ENCOUNTER — RX RENEWAL (OUTPATIENT)
Age: 79
End: 2024-05-19

## 2024-06-10 RX ORDER — LOSARTAN POTASSIUM 25 MG/1
25 TABLET, FILM COATED ORAL
Qty: 90 | Refills: 3 | Status: ACTIVE | COMMUNITY
Start: 1900-01-01 | End: 1900-01-01

## 2024-06-10 RX ORDER — METOPROLOL SUCCINATE 25 MG/1
25 TABLET, EXTENDED RELEASE ORAL
Qty: 90 | Refills: 3 | Status: ACTIVE | COMMUNITY
Start: 1900-01-01 | End: 1900-01-01

## 2024-07-02 ENCOUNTER — APPOINTMENT (OUTPATIENT)
Dept: HEART AND VASCULAR | Facility: CLINIC | Age: 79
End: 2024-07-02
Payer: MEDICARE

## 2024-07-02 ENCOUNTER — NON-APPOINTMENT (OUTPATIENT)
Age: 79
End: 2024-07-02

## 2024-07-02 VITALS
BODY MASS INDEX: 24.27 KG/M2 | SYSTOLIC BLOOD PRESSURE: 146 MMHG | HEART RATE: 75 BPM | HEIGHT: 63 IN | DIASTOLIC BLOOD PRESSURE: 79 MMHG | TEMPERATURE: 98.6 F | WEIGHT: 137 LBS | OXYGEN SATURATION: 97 %

## 2024-07-02 VITALS — SYSTOLIC BLOOD PRESSURE: 124 MMHG | DIASTOLIC BLOOD PRESSURE: 78 MMHG

## 2024-07-02 DIAGNOSIS — I25.10 ATHEROSCLEROTIC HEART DISEASE OF NATIVE CORONARY ARTERY W/OUT ANGINA PECTORIS: ICD-10-CM

## 2024-07-02 DIAGNOSIS — I10 ESSENTIAL (PRIMARY) HYPERTENSION: ICD-10-CM

## 2024-07-02 DIAGNOSIS — E78.00 PURE HYPERCHOLESTEROLEMIA, UNSPECIFIED: ICD-10-CM

## 2024-07-02 PROBLEM — R09.89 LABILE HYPERTENSION: Status: ACTIVE | Noted: 2024-07-02

## 2024-07-02 PROCEDURE — 36415 COLL VENOUS BLD VENIPUNCTURE: CPT

## 2024-07-02 PROCEDURE — 93784 AMBL BP MNTR W/SOFTWARE: CPT

## 2024-07-02 PROCEDURE — 93000 ELECTROCARDIOGRAM COMPLETE: CPT

## 2024-07-02 PROCEDURE — 99214 OFFICE O/P EST MOD 30 MIN: CPT

## 2024-07-03 LAB
ALBUMIN SERPL ELPH-MCNC: 4.3 G/DL
ALP BLD-CCNC: 63 U/L
ALT SERPL-CCNC: 16 U/L
ANION GAP SERPL CALC-SCNC: 11 MMOL/L
AST SERPL-CCNC: 27 U/L
BILIRUB SERPL-MCNC: 0.7 MG/DL
BUN SERPL-MCNC: 14 MG/DL
CALCIUM SERPL-MCNC: 9.6 MG/DL
CHLORIDE SERPL-SCNC: 99 MMOL/L
CHOLEST SERPL-MCNC: 163 MG/DL
CO2 SERPL-SCNC: 27 MMOL/L
CREAT SERPL-MCNC: 1.08 MG/DL
EGFR: 53 ML/MIN/1.73M2
ESTIMATED AVERAGE GLUCOSE: 108 MG/DL
GLUCOSE SERPL-MCNC: 94 MG/DL
HBA1C MFR BLD HPLC: 5.4 %
HCT VFR BLD CALC: 42.5 %
HDLC SERPL-MCNC: 74 MG/DL
HGB BLD-MCNC: 13.6 G/DL
LDLC SERPL CALC-MCNC: 73 MG/DL
MCHC RBC-ENTMCNC: 30.1 PG
MCHC RBC-ENTMCNC: 32 GM/DL
MCV RBC AUTO: 94 FL
NONHDLC SERPL-MCNC: 88 MG/DL
NT-PROBNP SERPL-MCNC: 297 PG/ML
PLATELET # BLD AUTO: 241 K/UL
POTASSIUM SERPL-SCNC: 4.8 MMOL/L
PROT SERPL-MCNC: 7 G/DL
RBC # BLD: 4.52 M/UL
RBC # FLD: 12.1 %
SODIUM SERPL-SCNC: 137 MMOL/L
TRIGL SERPL-MCNC: 85 MG/DL
TSH SERPL-ACNC: 2.71 UIU/ML
WBC # FLD AUTO: 8.05 K/UL

## 2024-07-09 ENCOUNTER — APPOINTMENT (OUTPATIENT)
Dept: HEART AND VASCULAR | Facility: CLINIC | Age: 79
End: 2024-07-09

## 2024-07-15 ENCOUNTER — APPOINTMENT (OUTPATIENT)
Dept: HEART AND VASCULAR | Facility: CLINIC | Age: 79
End: 2024-07-15
Payer: MEDICARE

## 2024-07-15 DIAGNOSIS — R09.89 OTHER SPECIFIED SYMPTOMS AND SIGNS INVOLVING THE CIRCULATORY AND RESPIRATORY SYSTEMS: ICD-10-CM

## 2024-07-15 PROCEDURE — 99212 OFFICE O/P EST SF 10 MIN: CPT

## 2024-08-05 ENCOUNTER — APPOINTMENT (OUTPATIENT)
Dept: HEART AND VASCULAR | Facility: CLINIC | Age: 79
End: 2024-08-05

## 2024-08-05 PROBLEM — R45.7 UNDER EMOTIONAL STRESS: Status: ACTIVE | Noted: 2024-08-05

## 2024-08-05 PROCEDURE — 99214 OFFICE O/P EST MOD 30 MIN: CPT

## 2024-08-05 NOTE — REASON FOR VISIT
[FreeTextEntry1] : 80y/o F with HTN and HLD.  CCS = 11.  Mild LAD dz.  Recent cold and wheezing  7/6/23   Admitted to [facility] with CP and + trops of 500- 600 and Tw inversions.  Pt reported increase in stress levels and had an emotional outburst..  Cath revealed non-obstructive dz and echo had RWMA.  She was DCed as an NSTEMI from [facility].  She did not tolerate nitrates due to hypotension. EF is 40-45 % with apical hypokinesia. 10/2/23 Echo Sept 25, 2023. EF back to NL. Stopping ASA, there was non-obst CAD.  1/29/24 Doing OK.  Caregiver turnover with her  is stressful. 7/2/24 describes over the last few days a sensation of a "shadow" going over her brain. no visual sxs. up to 4x in one hour. no hx of syncope. no presyncope. no SOB. BP has been elevated. BP at home was in the 150s. no dizziness. has been under immense stress.  7/15/24 TELEMED reviewed ABPM -/77. +dipping. daytime . currently taking HCTZ in the am, losartan and metop at night. remains under stress d/t caring for her . has not had sensations of a "shadow" recently as described above.  8/5/24 Saw Dr Coughlin for hip pain and doing pelvic floor PT.   Also has back pain.

## 2024-08-05 NOTE — REASON FOR VISIT
[FreeTextEntry1] : 78y/o F with HTN and HLD.  CCS = 11.  Mild LAD dz.  Recent cold and wheezing  7/6/23   Admitted to [facility] with CP and + trops of 500- 600 and Tw inversions.  Pt reported increase in stress levels and had an emotional outburst..  Cath revealed non-obstructive dz and echo had RWMA.  She was DCed as an NSTEMI from [facility].  She did not tolerate nitrates due to hypotension. EF is 40-45 % with apical hypokinesia. 10/2/23 Echo Sept 25, 2023. EF back to NL. Stopping ASA, there was non-obst CAD.  1/29/24 Doing OK.  Caregiver turnover with her  is stressful. 7/2/24 describes over the last few days a sensation of a "shadow" going over her brain. no visual sxs. up to 4x in one hour. no hx of syncope. no presyncope. no SOB. BP has been elevated. BP at home was in the 150s. no dizziness. has been under immense stress.  7/15/24 TELEMED reviewed ABPM -/77. +dipping. daytime . currently taking HCTZ in the am, losartan and metop at night. remains under stress d/t caring for her . has not had sensations of a "shadow" recently as described above.  8/5/24 Saw Dr Coughlin for hip pain and doing pelvic floor PT.   Also has back pain.

## 2024-08-05 NOTE — HISTORY OF PRESENT ILLNESS
[FreeTextEntry1] : formerly with Dr Manjula Rushing GYN- Dr Sotelo GI- Dr Jaren Miller- Rebekah and Daphne Rooney ([facility]) ENT- Dr Reyes Ophtho- Dr Musa Ortho- Dr Josesito Coughlin

## 2024-08-05 NOTE — ASSESSMENT
[FreeTextEntry1] : "shadow" sx -previous testing reviewed. non obstructive carotid artery dx. has been compliant with meds. suspect a stress component. will see what ABPM shows.  Mean BP was 128/74 and well controlled.  [place] CM- Under severe stress from husbands Parkinson's disease and son's illness.  Strategy discussed.  Stress management.  Enough help at home required.  Stopping ASA.  EF back to NL. 7/2/24 no sxs of chest pain or fluid overload. will monitor with yearly echos   HTN- BP excellent, no changes. 7/2/24 BP at goal in the office. labile at home -checking properly. will get an ABPM to r/o masked HTN. not orthostatic in the office today 7/15/24 -abpm reviewed. will try losartan in the am. reassurance provided   HLD- LDL 67, 73 on Lipitor 10 mg, consider 20 mg  ASHD- CCS = 11, mild LAD dz with calcified and non-calcified plaque, clean cor at cath 2023.  Health Maintenance- Shingrix x 2 2018,  Pneumovax 23 2013, Prevnar 13 2018, Flu shot annually Covid UTD.  Will give Pneumovax in 2025.  CA Screening- Mammo 6/2023, colonoscopy 2021

## 2024-08-31 DIAGNOSIS — U07.1 COVID-19: ICD-10-CM

## 2024-08-31 RX ORDER — NIRMATRELVIR AND RITONAVIR 300-100 MG
20 X 150 MG & KIT ORAL
Qty: 1 | Refills: 0 | Status: ACTIVE | COMMUNITY
Start: 2024-08-31 | End: 1900-01-01

## 2025-03-03 ENCOUNTER — APPOINTMENT (OUTPATIENT)
Dept: HEART AND VASCULAR | Facility: CLINIC | Age: 80
End: 2025-03-03
Payer: MEDICARE

## 2025-03-03 VITALS
DIASTOLIC BLOOD PRESSURE: 78 MMHG | WEIGHT: 143 LBS | HEART RATE: 87 BPM | TEMPERATURE: 98.9 F | HEIGHT: 63 IN | SYSTOLIC BLOOD PRESSURE: 140 MMHG | BODY MASS INDEX: 25.34 KG/M2 | OXYGEN SATURATION: 98 %

## 2025-03-03 DIAGNOSIS — I25.10 ATHEROSCLEROTIC HEART DISEASE OF NATIVE CORONARY ARTERY W/OUT ANGINA PECTORIS: ICD-10-CM

## 2025-03-03 DIAGNOSIS — I10 ESSENTIAL (PRIMARY) HYPERTENSION: ICD-10-CM

## 2025-03-03 DIAGNOSIS — E78.00 PURE HYPERCHOLESTEROLEMIA, UNSPECIFIED: ICD-10-CM

## 2025-03-03 PROCEDURE — 99214 OFFICE O/P EST MOD 30 MIN: CPT

## 2025-05-07 ENCOUNTER — RX RENEWAL (OUTPATIENT)
Age: 80
End: 2025-05-07

## 2025-05-15 ENCOUNTER — NON-APPOINTMENT (OUTPATIENT)
Age: 80
End: 2025-05-15

## 2025-05-15 ENCOUNTER — APPOINTMENT (OUTPATIENT)
Dept: HEART AND VASCULAR | Facility: CLINIC | Age: 80
End: 2025-05-15
Payer: MEDICARE

## 2025-05-15 VITALS
OXYGEN SATURATION: 98 % | DIASTOLIC BLOOD PRESSURE: 65 MMHG | HEIGHT: 63 IN | HEART RATE: 80 BPM | WEIGHT: 140 LBS | TEMPERATURE: 97.8 F | SYSTOLIC BLOOD PRESSURE: 140 MMHG | BODY MASS INDEX: 24.8 KG/M2

## 2025-05-15 DIAGNOSIS — R51.9 HEADACHE, UNSPECIFIED: ICD-10-CM

## 2025-05-15 DIAGNOSIS — I10 ESSENTIAL (PRIMARY) HYPERTENSION: ICD-10-CM

## 2025-05-15 PROCEDURE — 99214 OFFICE O/P EST MOD 30 MIN: CPT

## 2025-05-15 PROCEDURE — 93000 ELECTROCARDIOGRAM COMPLETE: CPT

## 2025-05-15 PROCEDURE — G2211 COMPLEX E/M VISIT ADD ON: CPT

## 2025-05-15 RX ORDER — POTASSIUM CHLORIDE 750 MG/1
10 CAPSULE, EXTENDED RELEASE ORAL
Refills: 0 | Status: DISCONTINUED | COMMUNITY
End: 2025-05-15

## 2025-05-15 RX ORDER — UBIDECARENONE/VIT E ACET 100MG-5
50 MCG CAPSULE ORAL
Refills: 0 | Status: ACTIVE | COMMUNITY

## 2025-05-16 LAB
ALBUMIN SERPL ELPH-MCNC: 4.6 G/DL
ALP BLD-CCNC: 78 U/L
ALT SERPL-CCNC: 26 U/L
ANION GAP SERPL CALC-SCNC: 17 MMOL/L
AST SERPL-CCNC: 33 U/L
BILIRUB SERPL-MCNC: 0.4 MG/DL
BUN SERPL-MCNC: 13 MG/DL
CALCIUM SERPL-MCNC: 10 MG/DL
CHLORIDE SERPL-SCNC: 97 MMOL/L
CHOLEST SERPL-MCNC: 161 MG/DL
CO2 SERPL-SCNC: 22 MMOL/L
CREAT SERPL-MCNC: 1.01 MG/DL
CRP SERPL-MCNC: 4 MG/L
EGFRCR SERPLBLD CKD-EPI 2021: 57 ML/MIN/1.73M2
ERYTHROCYTE [SEDIMENTATION RATE] IN BLOOD BY WESTERGREN METHOD: 14 MM/HR
GLUCOSE SERPL-MCNC: 109 MG/DL
HCT VFR BLD CALC: 40.8 %
HDLC SERPL-MCNC: 70 MG/DL
HGB BLD-MCNC: 13.7 G/DL
LDLC SERPL-MCNC: 64 MG/DL
MCHC RBC-ENTMCNC: 33.1 PG
MCHC RBC-ENTMCNC: 33.6 G/DL
MCV RBC AUTO: 98.6 FL
NONHDLC SERPL-MCNC: 90 MG/DL
PLATELET # BLD AUTO: 257 K/UL
POTASSIUM SERPL-SCNC: 4.3 MMOL/L
PROT SERPL-MCNC: 7.3 G/DL
RBC # BLD: 4.14 M/UL
RBC # FLD: 12.8 %
SODIUM SERPL-SCNC: 137 MMOL/L
TRIGL SERPL-MCNC: 161 MG/DL
WBC # FLD AUTO: 8.29 K/UL

## 2025-05-18 LAB
ESTIMATED AVERAGE GLUCOSE: 105 MG/DL
HBA1C MFR BLD HPLC: 5.3 %

## 2025-06-13 ENCOUNTER — RX RENEWAL (OUTPATIENT)
Age: 80
End: 2025-06-13